# Patient Record
Sex: MALE | Race: WHITE | NOT HISPANIC OR LATINO | Employment: FULL TIME | URBAN - METROPOLITAN AREA
[De-identification: names, ages, dates, MRNs, and addresses within clinical notes are randomized per-mention and may not be internally consistent; named-entity substitution may affect disease eponyms.]

---

## 2018-05-21 ENCOUNTER — TRANSFERRED RECORDS (OUTPATIENT)
Dept: MULTI SPECIALTY CLINIC | Facility: CLINIC | Age: 53
End: 2018-05-21

## 2021-05-03 ENCOUNTER — OFFICE VISIT (OUTPATIENT)
Dept: LAB | Facility: SCHOOL | Age: 56
End: 2021-05-03
Payer: COMMERCIAL

## 2021-05-03 VITALS
HEIGHT: 71 IN | TEMPERATURE: 98.2 F | BODY MASS INDEX: 30.49 KG/M2 | RESPIRATION RATE: 17 BRPM | WEIGHT: 217.8 LBS | OXYGEN SATURATION: 95 % | HEART RATE: 82 BPM | SYSTOLIC BLOOD PRESSURE: 110 MMHG | DIASTOLIC BLOOD PRESSURE: 70 MMHG

## 2021-05-03 DIAGNOSIS — Z00.00 WELLNESS EXAMINATION: Primary | ICD-10-CM

## 2021-05-03 PROBLEM — D12.8 BENIGN NEOPLASM OF RECTUM: Status: ACTIVE | Noted: 2018-05-21

## 2021-05-03 PROBLEM — E78.5 HYPERLIPIDEMIA: Status: ACTIVE | Noted: 2021-05-03

## 2021-05-03 PROBLEM — K57.30 DIVERTICULOSIS OF COLON, ACQUIRED: Status: ACTIVE | Noted: 2019-02-19

## 2021-05-03 PROBLEM — E66.9 MODERATE OBESITY: Status: ACTIVE | Noted: 2018-04-06

## 2021-05-03 PROBLEM — M47.816 LUMBAR ARTHROPATHY: Status: ACTIVE | Noted: 2020-12-23

## 2021-05-03 PROBLEM — M50.20 CERVICAL DISC HERNIATION: Status: ACTIVE | Noted: 2020-08-11

## 2021-05-03 PROCEDURE — 99203 OFFICE O/P NEW LOW 30 MIN: CPT

## 2021-05-03 RX ORDER — DICLOFENAC SODIUM 75 MG/1
75 TABLET, DELAYED RELEASE ORAL DAILY
COMMUNITY
Start: 2020-12-23 | End: 2021-07-21

## 2021-05-03 RX ORDER — PRAVASTATIN SODIUM 40 MG
40 TABLET ORAL DAILY
COMMUNITY
Start: 2021-03-11 | End: 2021-07-21

## 2021-05-03 RX ORDER — TADALAFIL 10 MG/1
20 TABLET ORAL PRN
COMMUNITY
Start: 2021-01-22 | End: 2021-07-21

## 2021-05-03 ASSESSMENT — MIFFLIN-ST. JEOR: SCORE: 1841.09

## 2021-05-03 NOTE — PROGRESS NOTES
"  SUBJECTIVE:  CC: John Guzman is an 56 year old woman who presents for Wellness Check at [facility].     Review of Healthy Lifestyle:    Do you get at least three servings of calcium containing foods daily (dairy, green leafy vegetables, etc.)? yes     Do you have a high-fiber diet? No, but does get some fiber in breakfast, whole grains, greens    Amount of exercise or daily activities, outside of work: 1 hour(s) per day    Do you wear sunscreen on a regular basis? Yes      Are you taking your medications regularly Yes     Have you had an eye exam in the past two years? yes    Do you see a dentist twice per year? yes    Do you have sleep apnea, excessive snoring or excessive daytime drowsiness? no    Do you use tobacco in any form? no       OBJECTIVE:    Vitals: /70   Pulse 82   Temp 98.2  F (36.8  C) (Tympanic)   Resp 17   Ht 1.797 m (5' 10.75\")   Wt 98.8 kg (217 lb 12.8 oz)   SpO2 95%   BMI 30.59 kg/m    BMI= Body mass index is 30.59 kg/m .    Right Ear:  500Hz: RESPONSE- on Level: 40 db   1000 Hz: RESPONSE- on Level: 40 db   2000 Hz: RESPONSE- on Level: 40 db   4000 Hz: RESPONSE- on Level: tone not heard    Left Ear:       500Hz:  RESPONSE- on Level: 40 db   1000 Hz: RESPONSE- on Level: 40 db   2000 Hz: RESPONSE- on Level: 40 db   4000 Hz: RESPONSE- on Level: tone not heard  Patient was seen and has hearing aids that he is picking up.    VISION:    Corrective lenses?  No, has had an exam in the past year     Medication Reconciliation: complete    ASSESSMENT/PLAN:  Patient is here today for wellness examination.  Patient was given information on recommendations for health, preventative care, diet and lifestyle changes for her health.  No referrals needed today.    COUNSELING:      reports that he has never smoked. He has never used smokeless tobacco.    Estimated body mass index is 30.59 kg/m  as calculated from the following:    Height as of this encounter: " "1.797 m (5' 10.75\").    Weight as of this encounter: 98.8 kg (217 lb 12.8 oz).   Weight management plan: Discussed healthy diet and exercise guidelines    Emma Romano NP on 5/3/2021 at 10:44 AM  [facility]     "

## 2021-05-03 NOTE — PATIENT INSTRUCTIONS
"                John Guzman has completed a Wellness Check at the [facility] on 5/3/2021.      ____________________________________________  [place] [employer] PROVID[employer]                                                                               Wellness Visit Recommendations:      See your regular primary care health provider every year in order to help stay healthy.    Review health changes.     Review your medicines if your doctor has prescribed any.    Talk to your provider about how often to have your cholesterol checked.    If you are at risk for diabetes, you should have a diabetes test (fasting glucose).    Shots: Get a flu shot each year. Get a tetanus shot every 10 years.     Review with your primary care provider other immunizations that you may need based on your age and/or medical/surgical history    Nutrition:     Eat at least 5 servings of fruits and vegetables each day.    Eat whole-grain bread, whole-wheat pasta and brown rice instead of white grains and rice.    Preventive Care to be reviewed by your primary care provider:    Females:        Cervical Cancer Screening                          Breast Cancer Screening                          Colon Cancer Screening  Males:             Prostrate Cancer Screening                          Colon Cancer Screening      Lifestyle:    Exercise at least 150 minutes a week (30 minutes a day, 5 days of the week). This will help you control your weight and help prevent disease or manage disease.    Limit alcohol to one drink per day or less depending on your past medical history.    No smoking.     Wear sunscreen to prevent skin cancer.    See your dentist every six months for an exam and cleaning.    Today's Vital Signs:  /70   Pulse 82   Temp 98.2  F (36.8  C) (Tympanic)   Resp 17   Ht 1.797 m (5' 10.75\")   Wt 98.8 kg (217 lb 12.8 oz)   SpO2 95%   BMI 30.59 kg/m    "

## 2021-06-05 ENCOUNTER — HEALTH MAINTENANCE LETTER (OUTPATIENT)
Age: 56
End: 2021-06-05

## 2021-07-20 ENCOUNTER — TELEPHONE (OUTPATIENT)
Dept: FAMILY MEDICINE | Facility: CLINIC | Age: 56
End: 2021-07-20

## 2021-07-20 NOTE — TELEPHONE ENCOUNTER
Left message. He has a video visit on Wednesday and this needs to be in person. Please change to in person same time and date is ok.    Lin Oviedo MA

## 2021-07-21 ENCOUNTER — OFFICE VISIT (OUTPATIENT)
Dept: FAMILY MEDICINE | Facility: CLINIC | Age: 56
End: 2021-07-21
Payer: COMMERCIAL

## 2021-07-21 VITALS
SYSTOLIC BLOOD PRESSURE: 128 MMHG | RESPIRATION RATE: 14 BRPM | DIASTOLIC BLOOD PRESSURE: 82 MMHG | BODY MASS INDEX: 29.54 KG/M2 | TEMPERATURE: 97.6 F | OXYGEN SATURATION: 97 % | HEIGHT: 71 IN | WEIGHT: 211 LBS | HEART RATE: 68 BPM

## 2021-07-21 DIAGNOSIS — R20.2 NUMBNESS AND TINGLING OF BOTH FEET: Primary | ICD-10-CM

## 2021-07-21 DIAGNOSIS — R20.0 NUMBNESS AND TINGLING OF BOTH FEET: Primary | ICD-10-CM

## 2021-07-21 DIAGNOSIS — N52.9 ERECTILE DYSFUNCTION, UNSPECIFIED ERECTILE DYSFUNCTION TYPE: ICD-10-CM

## 2021-07-21 DIAGNOSIS — K22.70 BARRETT'S ESOPHAGUS WITHOUT DYSPLASIA: ICD-10-CM

## 2021-07-21 DIAGNOSIS — E78.5 HYPERLIPIDEMIA, UNSPECIFIED HYPERLIPIDEMIA TYPE: ICD-10-CM

## 2021-07-21 LAB
HBA1C MFR BLD: 5.8 % (ref 0–5.6)
TSH SERPL DL<=0.005 MIU/L-ACNC: 2.5 MU/L (ref 0.4–4)
VIT B12 SERPL-MCNC: 709 PG/ML (ref 193–986)

## 2021-07-21 PROCEDURE — 84443 ASSAY THYROID STIM HORMONE: CPT | Performed by: FAMILY MEDICINE

## 2021-07-21 PROCEDURE — 99204 OFFICE O/P NEW MOD 45 MIN: CPT | Performed by: FAMILY MEDICINE

## 2021-07-21 PROCEDURE — 36415 COLL VENOUS BLD VENIPUNCTURE: CPT | Performed by: FAMILY MEDICINE

## 2021-07-21 PROCEDURE — 83036 HEMOGLOBIN GLYCOSYLATED A1C: CPT | Performed by: FAMILY MEDICINE

## 2021-07-21 PROCEDURE — 82607 VITAMIN B-12: CPT | Performed by: FAMILY MEDICINE

## 2021-07-21 RX ORDER — TADALAFIL 20 MG/1
20 TABLET ORAL
COMMUNITY
Start: 2021-07-08 | End: 2021-07-21

## 2021-07-21 RX ORDER — TADALAFIL 20 MG/1
20 TABLET ORAL DAILY PRN
Qty: 6 TABLET | Refills: 3 | Status: SHIPPED | OUTPATIENT
Start: 2021-07-21 | End: 2022-01-21

## 2021-07-21 RX ORDER — PRAVASTATIN SODIUM 40 MG
40 TABLET ORAL DAILY
Qty: 90 TABLET | Refills: 3 | Status: SHIPPED | OUTPATIENT
Start: 2021-07-21 | End: 2023-03-30

## 2021-07-21 ASSESSMENT — MIFFLIN-ST. JEOR: SCORE: 1810.25

## 2021-07-21 NOTE — LETTER
July 22, 2021      John Guzman  [address] [address] [facility] [address]        Dear ,    We are writing to inform you of your test results.  Most recent vitamin B12 and thyroid testing has returned satisfactory within normal limits. Hemoglobin A1c is elevated at 5.8 which is within the prediabetic range.  No need to start a medication at this time, will continue to work on healthy diet rich in fruits and vegetables and also exercise at least 150 minutes/week.  Plan to recheck hemoglobin A1c in 1 year.     If your symptoms worsen please let me know and we can further evaluate them.    Resulted Orders   Hemoglobin A1c   Result Value Ref Range    Hemoglobin A1C 5.8 (H) 0.0 - 5.6 %      Comment:      Normal <5.7%   Prediabetes 5.7-6.4%    Diabetes 6.5% or higher     Note: Adopted from ADA consensus guidelines.   TSH with free T4 reflex   Result Value Ref Range    TSH 2.50 0.40 - 4.00 mU/L   Vitamin B12   Result Value Ref Range    Vitamin B12 709 193 - 986 pg/mL     If you have any questions or concerns, please call the clinic at the number listed above.     Sincerely,        Neno Sargent, DO

## 2021-07-21 NOTE — PROGRESS NOTES
Assessment & Plan     Numbness and tingling of both feet  No pain, occasional numbness and tingling in the feet.  Is not real bothersome to the patient.  Evaluate with labs as below.  Discussed further evaluation with EMG versus neurology referral patient would like to hold off at this time which I feel is appropriate.   - Hemoglobin A1c  - TSH with free T4 reflex  - Vitamin B12    Traylor's esophagus without dysplasia  Stable. Doing well on Prilosec 20 mg daily. Refill provided today  - omeprazole (PRILOSEC) 20 MG DR capsule  Dispense: 90 capsule; Refill: 3    Erectile dysfunction, unspecified erectile dysfunction type  Doing well. Using Cialis once weekly. No concerns, refill provided today.   - tadalafil (CIALIS) 20 MG tablet  Dispense: 6 tablet; Refill: 3    Hyperlipidemia, unspecified hyperlipidemia type  Recently checked. 3/2021  Recent cholesterol testing showed total cholesterol 70, HDL 34, LDL of 95, triglycerides 205.  Continue Pravastatin.   - pravastatin (PRAVACHOL) 40 MG tablet  Dispense: 90 tablet; Refill: 3      The risks, benefits and treatment options of prescribed medications or other treatments have been discussed with the patient. The patient verbalized their understanding and should call or follow up if no improvement or if they develop further problems.      Neno Sargent DO  [facility] ALEKSEY Franklin is a 56 year old who presents for the following health issues     HPI   56-year-old male with past medical history of Traylor's esophagus, diverticulosis, hyperlipidemia, and erectile dysfunction presents to clinic to establish care.    Hyperlipidemia Follow-Up      Are you regularly taking any medication or supplement to lower your cholesterol?   Yes- prevastatin    Are you having muscle aches or other side effects that you think could be caused by your cholesterol lowering medication?  Yes- is experiencing some foot tingling ongoing for approx 6+ mo and  "bilateral hand itching for the same amount of time - he states this started around the same time he started taking the pravastatin    Currently on Pravastatin 40 mg he. Tolerating it well at this time.   Recent cholesterol testing showed total cholesterol 70, HDL 34, LDL of 95, triglycerides 205.    Feet tingling  Onset 6 months ago.  Feet tingling at times. No pain just occasionally happens. Also with some tingling in palms though less pronounced. 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  No weakness.     No recent diabetic screening.   No recent thyroid screen. No recent Vitamin B12 screen.   Feet tingling isn't real bothersome or affecting him but wanted to bring it up.         How many servings of fruits and vegetables do you eat daily?  2-3    On average, how many sweetened beverages do you drink each day (Examples: soda, juice, sweet tea, etc.  Do NOT count diet or artificially sweetened beverages)?   2 8 oz cans     How many days per week do you exercise enough to make your heart beat faster? 4    How many minutes a day do you exercise enough to make your heart beat faster? 30 - 60    How many days per week do you miss taking your medication? 0    Erectile dysfunction.   Cialis using once a week. 10 mg.   , things going well.   Needs refill.     Traylor's esophagus  Currently taking Prilosec 20 mg daily.  No GERD symptoms.  No abdominal pain.  History of smoking quit > 25 years ago.   Review of Systems   Constitutional, HEENT, cardiovascular, pulmonary, gi and gu systems are negative, except as otherwise noted.      Objective    /82 (BP Location: Right arm, Patient Position: Sitting, Cuff Size: Adult Large)   Pulse 68   Temp 97.6  F (36.4  C) (Tympanic)   Resp 14   Ht 1.797 m (5' 10.75\")   Wt 95.7 kg (211 lb)   SpO2 97%   BMI 29.64 kg/m    Body mass index is 29.64 kg/m .  Physical Exam   General: alert, cooperative, no acute distress   CV: RRR, no murmur  Resp: " non-labored breathing, clear to auscultation, no wheezing or rales   Abdomen: Soft, non-tender, no guarding.   Extremities: No peripheral edema, calves non-tender. Monofilament testing 10/10 bilaterally on feet. No open sores or lesions. No foot erythema.

## 2021-07-22 PROBLEM — R73.03 PREDIABETES: Status: ACTIVE | Noted: 2021-07-22

## 2021-07-22 NOTE — RESULT ENCOUNTER NOTE
Most recent vitamin B12 and thyroid testing has returned satisfactory within normal limits.    Hemoglobin A1c is elevated at 5.8 which is within the prediabetic range.  No need to start a medication at this time, will continue to work on healthy diet rich in fruits and vegetables and also exercise at least 150 minutes/week.  Plan to recheck hemoglobin A1c in 1 year.    If your symptoms worsen please let me know and we can further evaluate them.    Dr. Neno Sargent

## 2021-09-25 ENCOUNTER — HEALTH MAINTENANCE LETTER (OUTPATIENT)
Age: 56
End: 2021-09-25

## 2021-10-23 ENCOUNTER — HOSPITAL ENCOUNTER (EMERGENCY)
Facility: CLINIC | Age: 56
Discharge: HOME | End: 2021-10-23
Attending: PHYSICIAN ASSISTANT | Admitting: PHYSICIAN ASSISTANT
Payer: COMMERCIAL

## 2021-10-23 VITALS
SYSTOLIC BLOOD PRESSURE: 141 MMHG | WEIGHT: 215 LBS | HEART RATE: 79 BPM | OXYGEN SATURATION: 93 % | RESPIRATION RATE: 16 BRPM | TEMPERATURE: 97.9 F | BODY MASS INDEX: 30.2 KG/M2 | DIASTOLIC BLOOD PRESSURE: 105 MMHG

## 2021-10-23 DIAGNOSIS — T24.001A: ICD-10-CM

## 2021-10-23 DIAGNOSIS — T22.211A BURN OF RIGHT FOREARM, SECOND DEGREE, INITIAL ENCOUNTER: ICD-10-CM

## 2021-10-23 LAB
ANION GAP SERPL CALCULATED.3IONS-SCNC: 7 MMOL/L (ref 3–14)
BASOPHILS # BLD AUTO: 0 10E3/UL (ref 0–0.2)
BASOPHILS NFR BLD AUTO: 1 %
BUN SERPL-MCNC: 11 MG/DL (ref 7–30)
CALCIUM SERPL-MCNC: 8.3 MG/DL (ref 8.5–10.1)
CHLORIDE BLD-SCNC: 110 MMOL/L (ref 94–109)
CO2 SERPL-SCNC: 24 MMOL/L (ref 20–32)
CREAT SERPL-MCNC: 0.97 MG/DL (ref 0.66–1.25)
EOSINOPHIL # BLD AUTO: 0.2 10E3/UL (ref 0–0.7)
EOSINOPHIL NFR BLD AUTO: 3 %
ERYTHROCYTE [DISTWIDTH] IN BLOOD BY AUTOMATED COUNT: 12.2 % (ref 10–15)
GFR SERPL CREATININE-BSD FRML MDRD: 88 ML/MIN/1.73M2
GLUCOSE BLD-MCNC: 114 MG/DL (ref 70–99)
HCT VFR BLD AUTO: 41.6 % (ref 40–53)
HGB BLD-MCNC: 14.1 G/DL (ref 13.3–17.7)
IMM GRANULOCYTES # BLD: 0 10E3/UL
IMM GRANULOCYTES NFR BLD: 0 %
LYMPHOCYTES # BLD AUTO: 1.7 10E3/UL (ref 0.8–5.3)
LYMPHOCYTES NFR BLD AUTO: 28 %
MCH RBC QN AUTO: 29.1 PG (ref 26.5–33)
MCHC RBC AUTO-ENTMCNC: 33.9 G/DL (ref 31.5–36.5)
MCV RBC AUTO: 86 FL (ref 78–100)
MONOCYTES # BLD AUTO: 0.6 10E3/UL (ref 0–1.3)
MONOCYTES NFR BLD AUTO: 10 %
NEUTROPHILS # BLD AUTO: 3.5 10E3/UL (ref 1.6–8.3)
NEUTROPHILS NFR BLD AUTO: 58 %
NRBC # BLD AUTO: 0 10E3/UL
NRBC BLD AUTO-RTO: 0 /100
PLATELET # BLD AUTO: 182 10E3/UL (ref 150–450)
POTASSIUM BLD-SCNC: 3.8 MMOL/L (ref 3.4–5.3)
RBC # BLD AUTO: 4.85 10E6/UL (ref 4.4–5.9)
SODIUM SERPL-SCNC: 141 MMOL/L (ref 133–144)
WBC # BLD AUTO: 5.9 10E3/UL (ref 4–11)

## 2021-10-23 PROCEDURE — 96361 HYDRATE IV INFUSION ADD-ON: CPT

## 2021-10-23 PROCEDURE — 250N000013 HC RX MED GY IP 250 OP 250 PS 637: Performed by: PHYSICIAN ASSISTANT

## 2021-10-23 PROCEDURE — 80048 BASIC METABOLIC PNL TOTAL CA: CPT | Performed by: PHYSICIAN ASSISTANT

## 2021-10-23 PROCEDURE — 258N000003 HC RX IP 258 OP 636: Performed by: PHYSICIAN ASSISTANT

## 2021-10-23 PROCEDURE — 96374 THER/PROPH/DIAG INJ IV PUSH: CPT

## 2021-10-23 PROCEDURE — 99285 EMERGENCY DEPT VISIT HI MDM: CPT | Mod: 25

## 2021-10-23 PROCEDURE — 99285 EMERGENCY DEPT VISIT HI MDM: CPT | Performed by: FAMILY MEDICINE

## 2021-10-23 PROCEDURE — 96376 TX/PRO/DX INJ SAME DRUG ADON: CPT

## 2021-10-23 PROCEDURE — 250N000011 HC RX IP 250 OP 636: Performed by: PHYSICIAN ASSISTANT

## 2021-10-23 PROCEDURE — 90715 TDAP VACCINE 7 YRS/> IM: CPT | Performed by: PHYSICIAN ASSISTANT

## 2021-10-23 PROCEDURE — 90471 IMMUNIZATION ADMIN: CPT | Performed by: PHYSICIAN ASSISTANT

## 2021-10-23 PROCEDURE — 36415 COLL VENOUS BLD VENIPUNCTURE: CPT | Performed by: PHYSICIAN ASSISTANT

## 2021-10-23 PROCEDURE — 85025 COMPLETE CBC W/AUTO DIFF WBC: CPT | Performed by: PHYSICIAN ASSISTANT

## 2021-10-23 RX ORDER — HYDROMORPHONE HYDROCHLORIDE 1 MG/ML
0.5 INJECTION, SOLUTION INTRAMUSCULAR; INTRAVENOUS; SUBCUTANEOUS
Status: COMPLETED | OUTPATIENT
Start: 2021-10-23 | End: 2021-10-23

## 2021-10-23 RX ORDER — OXYCODONE AND ACETAMINOPHEN 5; 325 MG/1; MG/1
1 TABLET ORAL EVERY 6 HOURS PRN
Qty: 15 TABLET | Refills: 0 | Status: SHIPPED | OUTPATIENT
Start: 2021-10-23 | End: 2021-10-27

## 2021-10-23 RX ORDER — HYDROCODONE BITARTRATE AND ACETAMINOPHEN 5; 325 MG/1; MG/1
1 TABLET ORAL ONCE
Status: COMPLETED | OUTPATIENT
Start: 2021-10-23 | End: 2021-10-23

## 2021-10-23 RX ORDER — SODIUM CHLORIDE, SODIUM LACTATE, POTASSIUM CHLORIDE, CALCIUM CHLORIDE 600; 310; 30; 20 MG/100ML; MG/100ML; MG/100ML; MG/100ML
INJECTION, SOLUTION INTRAVENOUS CONTINUOUS
Status: DISCONTINUED | OUTPATIENT
Start: 2021-10-23 | End: 2021-10-23 | Stop reason: HOSPADM

## 2021-10-23 RX ADMIN — HYDROCODONE BITARTRATE AND ACETAMINOPHEN 1 TABLET: 5; 325 TABLET ORAL at 17:26

## 2021-10-23 RX ADMIN — CLOSTRIDIUM TETANI TOXOID ANTIGEN (FORMALDEHYDE INACTIVATED), CORYNEBACTERIUM DIPHTHERIAE TOXOID ANTIGEN (FORMALDEHYDE INACTIVATED), BORDETELLA PERTUSSIS TOXOID ANTIGEN (GLUTARALDEHYDE INACTIVATED), BORDETELLA PERTUSSIS FILAMENTOUS HEMAGGLUTININ ANTIGEN (FORMALDEHYDE INACTIVATED), BORDETELLA PERTUSSIS PERTACTIN ANTIGEN, AND BORDETELLA PERTUSSIS FIMBRIAE 2/3 ANTIGEN 0.5 ML: 5; 2; 2.5; 5; 3; 5 INJECTION, SUSPENSION INTRAMUSCULAR at 17:31

## 2021-10-23 RX ADMIN — HYDROMORPHONE HYDROCHLORIDE 0.5 MG: 1 INJECTION, SOLUTION INTRAMUSCULAR; INTRAVENOUS; SUBCUTANEOUS at 18:37

## 2021-10-23 RX ADMIN — SODIUM CHLORIDE, POTASSIUM CHLORIDE, SODIUM LACTATE AND CALCIUM CHLORIDE: 600; 310; 30; 20 INJECTION, SOLUTION INTRAVENOUS at 17:27

## 2021-10-23 RX ADMIN — HYDROMORPHONE HYDROCHLORIDE 0.5 MG: 1 INJECTION, SOLUTION INTRAMUSCULAR; INTRAVENOUS; SUBCUTANEOUS at 19:36

## 2021-10-23 ASSESSMENT — ENCOUNTER SYMPTOMS
GASTROINTESTINAL NEGATIVE: 1
LIGHT-HEADEDNESS: 1
WOUND: 1
CONSTITUTIONAL NEGATIVE: 1
CARDIOVASCULAR NEGATIVE: 1
RESPIRATORY NEGATIVE: 1
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
## 2021-12-30 ENCOUNTER — LAB (OUTPATIENT)
Dept: URGENT CARE | Facility: URGENT CARE | Age: 56
End: 2021-12-30
Attending: EMERGENCY MEDICINE
Payer: COMMERCIAL

## 2021-12-30 ENCOUNTER — E-VISIT (OUTPATIENT)
Dept: URGENT CARE | Facility: CLINIC | Age: 56
End: 2021-12-30
Payer: COMMERCIAL

## 2021-12-30 DIAGNOSIS — Z20.822 SUSPECTED COVID-19 VIRUS INFECTION: Primary | ICD-10-CM

## 2021-12-30 DIAGNOSIS — Z20.822 SUSPECTED COVID-19 VIRUS INFECTION: ICD-10-CM

## 2021-12-30 PROCEDURE — U0003 INFECTIOUS AGENT DETECTION BY NUCLEIC ACID (DNA OR RNA); SEVERE ACUTE RESPIRATORY SYNDROME CORONAVIRUS 2 (SARS-COV-2) (CORONAVIRUS DISEASE [COVID-19]), AMPLIFIED PROBE TECHNIQUE, MAKING USE OF HIGH THROUGHPUT TECHNOLOGIES AS DESCRIBED BY CMS-2020-01-R: HCPCS

## 2021-12-30 PROCEDURE — U0005 INFEC AGEN DETEC AMPLI PROBE: HCPCS

## 2021-12-30 PROCEDURE — 99421 OL DIG E/M SVC 5-10 MIN: CPT | Performed by: EMERGENCY MEDICINE

## 2021-12-30 NOTE — PATIENT INSTRUCTIONS
Rigoberto,      Based on your responses, you may have coronavirus (COVID-19). This illness can cause fever, cough and trouble breathing. Many people get a mild case and get better on their own. Some people can get very sick.    Will I be tested for COVID-19?  We would like to test you for COVID-19 virus. I have placed orders for this test.     To schedule: go to your [facility] home page and scroll down to the section that says  You have an appointment that needs to be scheduled  and click the large green button that says  Schedule Now  and follow the steps to find the next available openings.    If you are unable to complete these [facility] scheduling steps, please call [phone] to schedule your testing.     Return to work/school/ guidance:  Please let your workplace manager and staffing office know when your isolation ends.       If you receive a positive COVID-19 test result, follow the guidance of the those who are giving you the results. Usually the return to work is 10 days from symptom onset or positive test date, (or in some cases 20 days if you are immunocompromised). If your symptoms started after your positive test, the 10 days should start when your symptoms started.   o If you work at [email or web address], you must also be cleared by Employee Occupational Health and Safety to return to work.      If you receive a negative COVID-19 test result and did not have a high risk exposure to someone with a known positive COVID-19 test, you can return to work once you're free of fever for 24 hours without fever-reducing medication and your symptoms are improving or resolved.    If you receive a negative COVID-19 test and had a high-risk exposure to someone who has tested positive for COVID-19 then you can return to work 14 days after your last contact with the positive individual. Follow quarantine guidance given by your doctor or public health officials.     Sign up for [employer]:  We know it's scary to hear  that you might have COVID-19. We want to track your symptoms to make sure you're okay over the next 2 weeks. Please look for an email from [employer]--this is a free, online program that we'll use to keep in touch. To sign up, follow the link in the email you will receive. Learn more at [email or web address]    How can I take care of myself?  Over the counter medications may help with your symptoms like congestion, cough, chills, or fever. There are not many effective prescription treatments for early COVID-19. Hydroxychloroquine, ivermectin, and azithromycin are not effective or recommended for COVID-19.    If your symptoms started in the last 10 days, you may be able to receive a treatment with monoclonal antibodies. This treatment can lower your risk of severe illness and going to the hospital. It is given through an IV or under your skin (subcutaneous) and must be given at an infusion center. You must be 12 or older, weight at least 88 pounds, and have a positive COVID-19 test.     If you would like to sign up to be considered to receive the monoclonal antibody medicine, please complete a participation form through the [facility] here: MNRAP ([email or web address]). You may also call the [facility] COVID-19 Public Hotline at 1-220.389.2578 (open Mon-Fri: 9am-7pm and Sat: 10am-6pm).     Not all people who are eligible will receive the medicine, since supply is limited. You will be contacted in the next 1 to 2 business days only if you are selected. If you do not receive a call, you have not been selected to receive the medicine. If you have any questions about this medication, please contact your primary care provider. For more information, see [email or web address]      Get lots of rest. Drink extra fluids (unless a doctor has told you not to)    Take Tylenol (acetaminophen) or ibuprofen for fever or pain.  If you have liver or kidney problems, ask your family doctor if it's okay to take Tylenol o ibuprofen    Take over the counter medications for your symptoms, as directed by your doctor. You may also talk to your pharmacist.      If you have other health problems (like cancer, heart failure, an organ transplant or severe kidney disease): Call your specialty clinic if you don't feel better in the next 2 days.    Know when to call 911. Emergency warning signs include:  o Trouble breathing or shortness of breath  o Pain or pressure in the chest that doesn't go away  o Feeling confused like you haven't felt before, or not being able to wake up  o Bluish-colored lips or face    Where can I get more information?     [facility] [place] - About COVID-19: [email or web address]     CDC - What to Do If You're Sick:     www.cdc.gov/coronavirus/2019-ncov/about/steps-when-sick.html    CDC - Ending Home Isolation:  https://www.cdc.gov/coronavirus/2019-ncov/your-health/quarantine-isolation.html    CDC - Caring for Someone:  www.cdc.gov/coronavirus/2019-ncov/if-you-are-sick/care-for-someone.html    [facility] clinical trials (COVID-19 research studies): clinicalaffairs.[facility]-clinical-trials    Below are the COVID-19 hotlines at the [facility] ([facility]). Interpreters are available.  o For health questions: Call 782-732-2817 or 1-239.684.1393 (7 a.m. to 7 p.m.)  o For questions about schools and childcare: Call 775-926-4919 or 1-689.769.8694 (7 a.m. to 7 p.m.)

## 2021-12-31 LAB — SARS-COV-2 RNA RESP QL NAA+PROBE: POSITIVE

## 2022-01-18 DIAGNOSIS — N52.9 ERECTILE DYSFUNCTION, UNSPECIFIED ERECTILE DYSFUNCTION TYPE: ICD-10-CM

## 2022-01-21 RX ORDER — TADALAFIL 20 MG/1
20 TABLET ORAL DAILY PRN
Qty: 6 TABLET | Refills: 3 | Status: SHIPPED | OUTPATIENT
Start: 2022-01-21 | End: 2023-11-22

## 2022-03-08 ENCOUNTER — ANCILLARY PROCEDURE (OUTPATIENT)
Dept: GENERAL RADIOLOGY | Facility: OTHER | Age: 57
End: 2022-03-08
Attending: STUDENT IN AN ORGANIZED HEALTH CARE EDUCATION/TRAINING PROGRAM
Payer: COMMERCIAL

## 2022-03-08 ENCOUNTER — OFFICE VISIT (OUTPATIENT)
Dept: FAMILY MEDICINE | Facility: OTHER | Age: 57
End: 2022-03-08
Payer: COMMERCIAL

## 2022-03-08 VITALS
SYSTOLIC BLOOD PRESSURE: 126 MMHG | OXYGEN SATURATION: 96 % | TEMPERATURE: 97.7 F | RESPIRATION RATE: 17 BRPM | WEIGHT: 227 LBS | DIASTOLIC BLOOD PRESSURE: 72 MMHG | HEART RATE: 88 BPM | BODY MASS INDEX: 31.88 KG/M2

## 2022-03-08 DIAGNOSIS — G89.29 CHRONIC RIGHT SHOULDER PAIN: ICD-10-CM

## 2022-03-08 DIAGNOSIS — M54.2 NECK PAIN: Primary | ICD-10-CM

## 2022-03-08 DIAGNOSIS — M25.511 CHRONIC RIGHT SHOULDER PAIN: ICD-10-CM

## 2022-03-08 DIAGNOSIS — Z11.4 SCREENING FOR HIV (HUMAN IMMUNODEFICIENCY VIRUS): ICD-10-CM

## 2022-03-08 DIAGNOSIS — Z11.4 ENCOUNTER FOR SCREENING FOR HIV: ICD-10-CM

## 2022-03-08 DIAGNOSIS — Z13.1 DIABETES MELLITUS SCREENING: ICD-10-CM

## 2022-03-08 DIAGNOSIS — Z11.59 NEED FOR HEPATITIS C SCREENING TEST: ICD-10-CM

## 2022-03-08 DIAGNOSIS — Z13.220 SCREENING FOR HYPERLIPIDEMIA: ICD-10-CM

## 2022-03-08 DIAGNOSIS — E66.9 OBESITY (BMI 30-39.9): ICD-10-CM

## 2022-03-08 DIAGNOSIS — Z13.220 ENCOUNTER FOR SCREENING FOR LIPID DISORDER: ICD-10-CM

## 2022-03-08 DIAGNOSIS — Z98.890 STATUS POST CERVICAL DISCECTOMY: ICD-10-CM

## 2022-03-08 LAB
CHOLEST SERPL-MCNC: 246 MG/DL
FASTING STATUS PATIENT QL REPORTED: NO
HBA1C MFR BLD: 5.9 % (ref 0–5.6)
HDLC SERPL-MCNC: 34 MG/DL
LDLC SERPL CALC-MCNC: 143 MG/DL
LDLC SERPL CALC-MCNC: ABNORMAL MG/DL
NONHDLC SERPL-MCNC: 212 MG/DL
TRIGL SERPL-MCNC: 510 MG/DL

## 2022-03-08 PROCEDURE — 36415 COLL VENOUS BLD VENIPUNCTURE: CPT | Performed by: STUDENT IN AN ORGANIZED HEALTH CARE EDUCATION/TRAINING PROGRAM

## 2022-03-08 PROCEDURE — 99214 OFFICE O/P EST MOD 30 MIN: CPT | Performed by: STUDENT IN AN ORGANIZED HEALTH CARE EDUCATION/TRAINING PROGRAM

## 2022-03-08 PROCEDURE — 72040 X-RAY EXAM NECK SPINE 2-3 VW: CPT | Performed by: RADIOLOGY

## 2022-03-08 PROCEDURE — 83721 ASSAY OF BLOOD LIPOPROTEIN: CPT | Mod: 59 | Performed by: STUDENT IN AN ORGANIZED HEALTH CARE EDUCATION/TRAINING PROGRAM

## 2022-03-08 PROCEDURE — 83036 HEMOGLOBIN GLYCOSYLATED A1C: CPT | Performed by: STUDENT IN AN ORGANIZED HEALTH CARE EDUCATION/TRAINING PROGRAM

## 2022-03-08 PROCEDURE — 87389 HIV-1 AG W/HIV-1&-2 AB AG IA: CPT | Performed by: STUDENT IN AN ORGANIZED HEALTH CARE EDUCATION/TRAINING PROGRAM

## 2022-03-08 PROCEDURE — 73030 X-RAY EXAM OF SHOULDER: CPT | Mod: RT | Performed by: RADIOLOGY

## 2022-03-08 PROCEDURE — 80061 LIPID PANEL: CPT | Performed by: STUDENT IN AN ORGANIZED HEALTH CARE EDUCATION/TRAINING PROGRAM

## 2022-03-08 PROCEDURE — 86803 HEPATITIS C AB TEST: CPT | Performed by: STUDENT IN AN ORGANIZED HEALTH CARE EDUCATION/TRAINING PROGRAM

## 2022-03-08 ASSESSMENT — PAIN SCALES - GENERAL: PAINLEVEL: WORST PAIN (10)

## 2022-03-08 NOTE — PATIENT INSTRUCTIONS
Neck: Exercises  Introduction  Here are some examples of exercises for you to try. The exercises may be suggested for a condition or for rehabilitation. Start each exercise slowly. Ease off the exercises if you start to have pain.  You will be told when to start these exercises and which ones will work best for you.  How to do the exercises  Neck stretch   1. This stretch works best if you keep your shoulder down as you lean away from it. To help you remember to do this, start by relaxing your shoulders and lightly holding on to your thighs or your chair.  2. Tilt your head toward your shoulder and hold for 15 to 30 seconds. Let the weight of your head stretch your muscles.  3. If you would like a little added stretch, use your hand to gently and steadily pull your head toward your shoulder. For example, keeping your right shoulder down, lean your head to the left.  4. Repeat 2 to 4 times toward each shoulder.   Diagonal neck stretch   1. Turn your head slightly toward the direction you will be stretching, and tilt your head diagonally toward your chest and hold for 15 to 30 seconds.  2. If you would like a little added stretch, use your hand to gently and steadily pull your head forward on the diagonal.  3. Repeat 2 to 4 times toward each side.   Dorsal glide stretch   The dorsal glide stretches the back of the neck. If you feel pain, do not glide so far back. Some people find this exercise easier to do while lying on their backs with an ice pack on the neck.  1. Sit or stand tall and look straight ahead.  2. Slowly tuck your chin as you glide your head backward over your body  3. Hold for a count of 6, and then relax for up to 10 seconds.  4. Repeat 8 to 12 times.   Chest and shoulder stretch   1. Sit or stand tall and glide your head backward as in the dorsal glide stretch.  2. Raise both arms so that your hands are next to your ears.  3. Take a deep breath, and as you breathe out, lower your elbows down and  behind your back. You will feel your shoulder blades slide down and together, and at the same time you will feel a stretch across your chest and the front of your shoulders.  4. Hold for about 6 seconds, and then relax for up to 10 seconds.  5. Repeat 8 to 12 times.   Strengthening: Hands on head   1. Move your head backward, forward, and side to side against gentle pressure from your hands, holding each position for about 6 seconds.  2. Repeat 8 to 12 times.

## 2022-03-08 NOTE — PROGRESS NOTES
Assessment & Plan     Neck pain  Status post cervical discectomy  Previous C5/C6 arthroplasty on the left side back in 2020 through [facility], patient does have some chronic paresthesias over the C8/T1 distribution distally.  His symptoms are starting on the right side and similar progression prior to his previous left-sided surgery.  He is interested in getting a revisit/establish care with his neurosurgery providers.  I do not appreciate any radiculopathy on the right side today, negative Spurling's.  Possibly could be frozen shoulder even?  Will be getting x-rays of his neck as well as shoulder today.  Also given a handout in regards to home exercises/physical therapy.  We will also send over a neurosurgery consult just for his known previous surgical history.  - Neurosurgery Referral; Future  - XR Cervical Spine 2/3 Views    Chronic right shoulder pain  X-ray today and home exercises/physical therapy.  Could possibly be frozen shoulder.  - XR Shoulder Right G/E 3 Views    Screening for HIV (human immunodeficiency virus)  - HIV Antigen Antibody Combo    Need for hepatitis C screening test  - Hepatitis C Screen Reflex to HCV RNA Quant and Genotype    Screening for hyperlipidemia  Continue pravastatin  - Lipid panel reflex to direct LDL Fasting    Diabetes mellitus screening  Obesity (BMI 30-39.9)  - Hemoglobin A1c    I have spent 30 or more minutes face-to-face with the patient and coordinating care such as reviewing documentation, results, or having discussions over the phone.      DEVIKA LUNA MD  [facility] EILEEN Franklin is a 57 year old who presents for the following health issues     Musculoskeletal Problem    History of Present Illness       Reason for visit:  Right arm & shoulder pain,  Symptom onset:  More than a month  Symptoms include:  Sharp pain like im being stabbed by a pencil  Symptom intensity:  Severe  Symptom progression:  Worsening  Had these  symptoms before:  Yes  Has tried/received treatment for these symptoms:  Yes  Previous treatment was successful:  Yes  Prior treatment description:  Cervical disk replaced  What makes it worse:  Laying on it  What makes it better:  Nothing    He eats 2-3 servings of fruits and vegetables daily.He consumes 2 sweetened beverage(s) daily.He exercises with enough effort to increase his heart rate 20 to 29 minutes per day.  He exercises with enough effort to increase his heart rate 3 or less days per week.   He is taking medications regularly.           Review of Systems   Constitutional, HEENT, cardiovascular, pulmonary, gi and gu systems are negative, except as otherwise noted.      Objective    /72   Pulse 88   Temp 97.7  F (36.5  C) (Temporal)   Resp 17   Wt 103 kg (227 lb)   SpO2 96%   BMI 31.88 kg/m    Body mass index is 31.88 kg/m .  Physical Exam  Vitals and nursing note reviewed.   Constitutional:       General: He is not in acute distress.     Appearance: Normal appearance. He is not ill-appearing, toxic-appearing or diaphoretic.   HENT:      Head: Normocephalic and atraumatic.      Right Ear: Tympanic membrane, ear canal and external ear normal. There is no impacted cerumen.      Left Ear: Tympanic membrane, ear canal and external ear normal. There is no impacted cerumen.      Nose: Nose normal. No congestion or rhinorrhea.      Mouth/Throat:      Mouth: Mucous membranes are moist.      Pharynx: Oropharynx is clear. No oropharyngeal exudate or posterior oropharyngeal erythema.   Eyes:      General:         Right eye: No discharge.         Left eye: No discharge.      Extraocular Movements: Extraocular movements intact.      Conjunctiva/sclera: Conjunctivae normal.      Pupils: Pupils are equal, round, and reactive to light.   Cardiovascular:      Rate and Rhythm: Normal rate and regular rhythm.      Heart sounds: No murmur heard.  Pulmonary:      Effort: Pulmonary effort is normal. No respiratory  distress.      Breath sounds: Normal breath sounds.   Musculoskeletal:         General: Normal range of motion.      Cervical back: Normal range of motion.      Comments: Pain with active and passive range of motion of the right shoulder, tender to palpation over the proximal bicep tendon area/insertion of the pectoralis major near the head of the humerus.  Neurovascular intact distally on the right upper extremity.  Slight decrease in sensation over the left fourth and fifth digit.  Negative Spurling's bilaterally   Lymphadenopathy:      Cervical: No cervical adenopathy.   Neurological:      Mental Status: He is alert.   Psychiatric:         Mood and Affect: Mood normal.         Behavior: Behavior normal.         Thought Content: Thought content normal.

## 2022-03-09 LAB
HCV AB SERPL QL IA: NONREACTIVE
HIV 1+2 AB+HIV1 P24 AG SERPL QL IA: NONREACTIVE

## 2022-03-10 ENCOUNTER — TELEPHONE (OUTPATIENT)
Dept: NEUROSURGERY | Facility: CLINIC | Age: 57
End: 2022-03-10
Payer: COMMERCIAL

## 2022-03-10 ENCOUNTER — TELEPHONE (OUTPATIENT)
Dept: FAMILY MEDICINE | Facility: OTHER | Age: 57
End: 2022-03-10
Payer: COMMERCIAL

## 2022-03-10 DIAGNOSIS — Z98.890 STATUS POST CERVICAL DISCECTOMY: ICD-10-CM

## 2022-03-10 DIAGNOSIS — M25.511 CHRONIC RIGHT SHOULDER PAIN: Primary | ICD-10-CM

## 2022-03-10 DIAGNOSIS — M54.12 CERVICAL RADICULOPATHY: Primary | ICD-10-CM

## 2022-03-10 DIAGNOSIS — G89.29 CHRONIC RIGHT SHOULDER PAIN: Primary | ICD-10-CM

## 2022-03-10 DIAGNOSIS — M54.2 NECK PAIN: ICD-10-CM

## 2022-03-10 RX ORDER — CYCLOBENZAPRINE HCL 10 MG
10 TABLET ORAL 3 TIMES DAILY PRN
Qty: 30 TABLET | Refills: 0 | Status: SHIPPED | OUTPATIENT
Start: 2022-03-10 | End: 2022-03-20

## 2022-03-10 RX ORDER — METHYLPREDNISOLONE 4 MG
TABLET, DOSE PACK ORAL
Qty: 21 TABLET | Refills: 0 | Status: SHIPPED | OUTPATIENT
Start: 2022-03-10 | End: 2022-03-24

## 2022-03-10 RX ORDER — HYDROCODONE BITARTRATE AND ACETAMINOPHEN 5; 325 MG/1; MG/1
1 TABLET ORAL EVERY 6 HOURS PRN
Qty: 12 TABLET | Refills: 0 | Status: SHIPPED | OUTPATIENT
Start: 2022-03-10 | End: 2022-03-13

## 2022-03-10 NOTE — TELEPHONE ENCOUNTER
Pt called in and is in severe pain. Has a pinched nerve. Was just  Seen 2 days ago by pcp. The pain has gotten worse and he hasnt slept in 2 nights. hes afraid he is losing his mind without sleep.

## 2022-03-10 NOTE — TELEPHONE ENCOUNTER
Patient would like an oral sedative to take prior to the MRI due to high pain levels when lying down. Please send to [facility] [employer] in [place]. #[phone].

## 2022-03-10 NOTE — TELEPHONE ENCOUNTER
Phoned in Valium 5 mg #2 to take 1 tab PO 1 hour prior to MRI, may repeat another 1 tab 30 minutes prior to test prn.    Brie Mcallister RN

## 2022-03-10 NOTE — TELEPHONE ENCOUNTER
LM for patient to return call and speak with triage nurse.     Please advise of providers note below. Be sure to indicate medication should be taken sparingly and NO refills will be provided without a follow up office visit.     Anila MUÑOZN, RN

## 2022-03-10 NOTE — TELEPHONE ENCOUNTER
I would appreciate a little bit better triage on this.    Based on review of the medical record I would be willing to send 3 prescriptions to the pharmacy for him which are very short duration and he will need to follow-up with his primary care provider for further refills.  There will be no refills without an office visit.  He is to use the narcotics sparingly and please make him aware that it might not help his pain at all but hopefully the muscle relaxant and the steroid will help.

## 2022-03-11 ENCOUNTER — MEDICAL CORRESPONDENCE (OUTPATIENT)
Dept: NEUROSURGERY | Facility: CLINIC | Age: 57
End: 2022-03-11
Payer: COMMERCIAL

## 2022-03-11 NOTE — TELEPHONE ENCOUNTER
Called patient and advised of information below. Patient has no further questions at this time.     Anila Farias BSN, RN

## 2022-03-15 ENCOUNTER — TELEPHONE (OUTPATIENT)
Dept: NEUROSURGERY | Facility: CLINIC | Age: 57
End: 2022-03-15
Payer: COMMERCIAL

## 2022-03-15 NOTE — CONFIDENTIAL NOTE
Discussed rigoberto the history of his arm symptoms.. Rigoberto reports arm symptoms for almost one year. He has tried home physical therapy/stretching/yoga and conservative measures including ice/heat/OTC medications. He was able to describe to me in detail these exercises, he received a print out from physical therapy after his previous surgery.   Exercises include rhomboid, thoracic extension, nerve glide, using a stretch band while extending both arms outward, Stretching the arms across the chest etc. He tells me he has done these multiple times a week over the last year without improvement.        The arm pain has been refractory to these conservative measures. MRI was ordered to better evaluate.

## 2022-03-16 ENCOUNTER — HOSPITAL ENCOUNTER (OUTPATIENT)
Dept: MRI IMAGING | Facility: CLINIC | Age: 57
Discharge: HOME | End: 2022-03-16
Attending: SURGERY
Payer: COMMERCIAL

## 2022-03-16 ENCOUNTER — HOSPITAL ENCOUNTER (OUTPATIENT)
Dept: GENERAL RADIOLOGY | Facility: CLINIC | Age: 57
Discharge: HOME | End: 2022-03-16
Attending: SURGERY
Payer: COMMERCIAL

## 2022-03-16 DIAGNOSIS — M54.12 CERVICAL RADICULOPATHY: ICD-10-CM

## 2022-03-16 PROCEDURE — 255N000002 HC RX 255 OP 636: Performed by: SURGERY

## 2022-03-16 PROCEDURE — A9585 GADOBUTROL INJECTION: HCPCS | Performed by: SURGERY

## 2022-03-16 PROCEDURE — 72156 MRI NECK SPINE W/O & W/DYE: CPT

## 2022-03-16 PROCEDURE — 72040 X-RAY EXAM NECK SPINE 2-3 VW: CPT

## 2022-03-16 RX ORDER — GADOBUTROL 604.72 MG/ML
10 INJECTION INTRAVENOUS ONCE
Status: COMPLETED | OUTPATIENT
Start: 2022-03-16 | End: 2022-03-16

## 2022-03-16 RX ADMIN — GADOBUTROL 10 ML: 604.72 INJECTION INTRAVENOUS at 09:56

## 2022-03-24 ENCOUNTER — OFFICE VISIT (OUTPATIENT)
Dept: NEUROSURGERY | Facility: CLINIC | Age: 57
End: 2022-03-24
Payer: COMMERCIAL

## 2022-03-24 VITALS
DIASTOLIC BLOOD PRESSURE: 85 MMHG | HEIGHT: 71 IN | WEIGHT: 220 LBS | HEART RATE: 83 BPM | SYSTOLIC BLOOD PRESSURE: 117 MMHG | BODY MASS INDEX: 30.8 KG/M2 | OXYGEN SATURATION: 97 %

## 2022-03-24 DIAGNOSIS — M54.2 NECK PAIN: ICD-10-CM

## 2022-03-24 DIAGNOSIS — Z98.890 STATUS POST CERVICAL DISCECTOMY: ICD-10-CM

## 2022-03-24 PROCEDURE — 99203 OFFICE O/P NEW LOW 30 MIN: CPT | Performed by: SURGERY

## 2022-03-24 NOTE — NURSING NOTE
Neurosurgery consultation was requested by: Dr. Kulkarni   Pain: denies neck pain   Radicular Pain is present: right arm pain   Lhermitte sign: no  Motor complaints: denies weakness  Sensory complaints: numbness in 4th and 5th fingers on left hand   Gait and balance issues: no  Bowel or bladder issues: no  Duration of SX is: a year   The symptoms are worse with: constant   The symptoms are better with: medrol dose pack   Injury: no   Severity is: moderate  Patient has tried the following conservative measures: he has done PT but did not help  NDI score is :   ESTEPHANIA Ortega

## 2022-03-24 NOTE — PROGRESS NOTES
NEUROSURGERY CONSULTATION NOTE    Neurosurgery was asked to see this patient by Dr Kulkarni for evaluation of cervical radiculopathy.     CONSULTATION ASSESSMENT AND PLAN:    58 yo male who is status post C5-6 arthroplasty with Dr. Lopez on 8/31/20 for C5-6 disc herniation who presents with right arm pain for last year. MRI of his cervical spine shows past artificial disc replacement at C5-6. Mild bilateral foraminal narrowing at this level. Also has bilateral foraminal narrowing at C4-5 mild in nature. We discussed symptoms appear most likely in right C5 distribution. Right now symptoms improved after medrol dose pack. If symptoms worsen recommend a right C4-5 TFESI. He will return to clinic if symptoms worsen or he develops new neurological symptoms.     I spent more than 30 minutes in this apt, examining the pt, reviewing the scans, reviewing notes from chart, discussing treatment options with risks and benefits and coordinating care.     Esperanza Ferrer MD      HPI:  58 yo male who is status post C5-6 arthroplasty with Dr. Lopez on 8/31/20 for C5-6 disc herniation who presents with right arm pain for last year. Mostly in the right shoulder down to his elbow. This really has progressed in the last month. Aching mostly centered around antecubital region and shoulder. Feels weak when he is having the pain. Hold the arm at 90 degree at elbow to improve symptoms. Difficulty laying flat aggravates his symptoms. 4th and 5th digit numbness on left only otherwise no left arm symptoms.     Recently took a medrol dose pack with great improvement of his symptoms. Currently symptoms are not that bothersome.   Does home exercises from past physical therapy without relief.   Has tried a pain medication and muscle relaxant as well. Helped a little bit      Past medical history-  Cervical radiculopathy      Past Surgical History:   Procedure Laterality Date     CERVICAL ARTHOPLASTY/ ARTIFIC DISC, SINGLE       plantar  Fachitiis surgery Left        REVIEW OF SYSTEMS:  Denies history of anesthetic reactions.     MEDICATIONS:  Current Outpatient Medications   Medication Sig Dispense Refill     methylPREDNISolone (MEDROL DOSEPAK) 4 MG tablet therapy pack Follow Package Directions 21 tablet 0     omeprazole (PRILOSEC) 20 MG DR capsule Take 1 capsule (20 mg) by mouth daily 90 capsule 3     pravastatin (PRAVACHOL) 40 MG tablet Take 1 tablet (40 mg) by mouth daily 90 tablet 3     tadalafil (CIALIS) 20 MG tablet Take 1 tablet (20 mg) by mouth daily as needed (erectile dysfunction) 6 tablet 3         ALLERGIES/SENSITIVITIES:     No Known Allergies    PERTINENT SOCIAL HISTORY:   Social History     Socioeconomic History     Marital status:      Spouse name: Not on file     Number of children: Not on file     Years of education: Not on file     Highest education level: Not on file   Occupational History     Not on file   Tobacco Use     Smoking status: Never Smoker     Smokeless tobacco: Never Used   Vaping Use     Vaping Use: Never used   Substance and Sexual Activity     Alcohol use: Not Currently     Drug use: Never     Sexual activity: Yes     Partners: Female     Birth control/protection: Female Surgical   Other Topics Concern     Not on file   Social History Narrative     Not on file     Social Determinants of Health     Financial Resource Strain: Not on file   Food Insecurity: Not on file   Transportation Needs: Not on file   Physical Activity: Not on file   Stress: Not on file   Social Connections: Not on file   Intimate Partner Violence: Not on file   Housing Stability: Not on file         FAMILY HISTORY:  Family History   Problem Relation Age of Onset     Brain Tumor Mother      Other - See Comments Sister         iron lung     No Known Problems Maternal Grandfather      No Known Problems Paternal Grandmother      Alcoholism Sister      No Known Problems Paternal Grandfather         PHYSICAL EXAM:   Constitutional: /85   " Pulse 83   Ht 5' 10.75\" (1.797 m)   Wt 220 lb (99.8 kg)   SpO2 97%   BMI 30.90 kg/m       Mental Status: A & O in no acute distress.  Affect is appropriate.  Speech is fluent.  Recent and remote memory are intact.  Attention span and concentration are normal.     Motor: Normal bulk and tone all muscle groups of upper and lower extremities.    Strength: 5/5x4     Sensory: Sensation intact bilaterally to light touch.     Coordination: Heel/toe/tandem gait intact.  Normal gait and station.     Reflexes: supinator, biceps, triceps, knee/ ankle jerk intact. No portillo's/babinski/ clonus.    IMAGING:  I personally reviewed all radiographic images         Cc:   Ari Kulkarni           "

## 2022-03-24 NOTE — LETTER
3/24/2022         RE: John Guzman  [address] [facility] [address]        Dear Colleague,    Thank you for referring your patient, John Guzman, to the [facility] NEUROSURGERY CLINIC [facility]. Please see a copy of my visit note below.    NEUROSURGERY CONSULTATION NOTE    Neurosurgery was asked to see this patient by Dr Kulkarni for evaluation of cervical radiculopathy.     CONSULTATION ASSESSMENT AND PLAN:    56 yo male who is status post C5-6 arthroplasty with Dr. Lopez on 8/31/20 for C5-6 disc herniation who presents with right arm pain for last year. MRI of his cervical spine shows past artificial disc replacement at C5-6. Mild bilateral foraminal narrowing at this level. Also has bilateral foraminal narrowing at C4-5 mild in nature. We discussed symptoms appear most likely in right C5 distribution. Right now symptoms improved after medrol dose pack. If symptoms worsen recommend a right C4-5 TFESI. He will return to clinic if symptoms worsen or he develops new neurological symptoms.     I spent more than 30 minutes in this apt, examining the pt, reviewing the scans, reviewing notes from chart, discussing treatment options with risks and benefits and coordinating care.     Esperanza Ferrer MD      HPI:  56 yo male who is status post C5-6 arthroplasty with Dr. Lopez on 8/31/20 for C5-6 disc herniation who presents with right arm pain for last year. Mostly in the right shoulder down to his elbow. This really has progressed in the last month. Aching mostly centered around antecubital region and shoulder. Feels weak when he is having the pain. Hold the arm at 90 degree at elbow to improve symptoms. Difficulty laying flat aggravates his symptoms. 4th and 5th digit numbness on left only otherwise no left arm symptoms.     Recently took a medrol dose pack with great improvement of his symptoms. Currently symptoms are not that bothersome.   Does home exercises from past physical therapy  without relief.   Has tried a pain medication and muscle relaxant as well. Helped a little bit      Past medical history-  Cervical radiculopathy      Past Surgical History:   Procedure Laterality Date     CERVICAL ARTHOPLASTY/ ARTIFIC DISC, SINGLE       plantar Fachitiis surgery Left        REVIEW OF SYSTEMS:  Denies history of anesthetic reactions.     MEDICATIONS:  Current Outpatient Medications   Medication Sig Dispense Refill     methylPREDNISolone (MEDROL DOSEPAK) 4 MG tablet therapy pack Follow Package Directions 21 tablet 0     omeprazole (PRILOSEC) 20 MG DR capsule Take 1 capsule (20 mg) by mouth daily 90 capsule 3     pravastatin (PRAVACHOL) 40 MG tablet Take 1 tablet (40 mg) by mouth daily 90 tablet 3     tadalafil (CIALIS) 20 MG tablet Take 1 tablet (20 mg) by mouth daily as needed (erectile dysfunction) 6 tablet 3         ALLERGIES/SENSITIVITIES:     No Known Allergies    PERTINENT SOCIAL HISTORY:   Social History     Socioeconomic History     Marital status:      Spouse name: Not on file     Number of children: Not on file     Years of education: Not on file     Highest education level: Not on file   Occupational History     Not on file   Tobacco Use     Smoking status: Never Smoker     Smokeless tobacco: Never Used   Vaping Use     Vaping Use: Never used   Substance and Sexual Activity     Alcohol use: Not Currently     Drug use: Never     Sexual activity: Yes     Partners: Female     Birth control/protection: Female Surgical   Other Topics Concern     Not on file   Social History Narrative     Not on file     Social Determinants of Health     Financial Resource Strain: Not on file   Food Insecurity: Not on file   Transportation Needs: Not on file   Physical Activity: Not on file   Stress: Not on file   Social Connections: Not on file   Intimate Partner Violence: Not on file   Housing Stability: Not on file         FAMILY HISTORY:  Family History   Problem Relation Age of Onset     Brain Tumor  "Mother      Other - See Comments Sister         iron lung     No Known Problems Maternal Grandfather      No Known Problems Paternal Grandmother      Alcoholism Sister      No Known Problems Paternal Grandfather         PHYSICAL EXAM:   Constitutional: /85   Pulse 83   Ht 5' 10.75\" (1.797 m)   Wt 220 lb (99.8 kg)   SpO2 97%   BMI 30.90 kg/m       Mental Status: A & O in no acute distress.  Affect is appropriate.  Speech is fluent.  Recent and remote memory are intact.  Attention span and concentration are normal.     Motor: Normal bulk and tone all muscle groups of upper and lower extremities.    Strength: 5/5x4     Sensory: Sensation intact bilaterally to light touch.     Coordination: Heel/toe/tandem gait intact.  Normal gait and station.     Reflexes: supinator, biceps, triceps, knee/ ankle jerk intact. No portillo's/babinski/ clonus.    IMAGING:  I personally reviewed all radiographic images         Cc:   Ari Kulkarni, thank you for allowing me to participate in the care of your patient.        Sincerely,        Esperanza Ferrer MD    "

## 2022-07-02 ENCOUNTER — HEALTH MAINTENANCE LETTER (OUTPATIENT)
Age: 57
End: 2022-07-02

## 2022-08-08 DIAGNOSIS — K22.70 BARRETT'S ESOPHAGUS WITHOUT DYSPLASIA: ICD-10-CM

## 2022-08-10 NOTE — TELEPHONE ENCOUNTER
Short refill provided. Needs office visit for future refills as not seen in over a year.    Name band;

## 2022-09-21 ENCOUNTER — OFFICE VISIT (OUTPATIENT)
Dept: PODIATRY | Facility: CLINIC | Age: 57
End: 2022-09-21
Payer: COMMERCIAL

## 2022-09-21 DIAGNOSIS — M72.2 PLANTAR FASCIITIS: ICD-10-CM

## 2022-09-21 DIAGNOSIS — Q66.71 CAVUS DEFORMITY OF RIGHT FOOT: ICD-10-CM

## 2022-09-21 PROCEDURE — 20550 NJX 1 TENDON SHEATH/LIGAMENT: CPT | Mod: 50 | Performed by: PODIATRIST

## 2022-09-21 PROCEDURE — 99203 OFFICE O/P NEW LOW 30 MIN: CPT | Mod: 25 | Performed by: PODIATRIST

## 2022-09-21 RX ORDER — TRIAMCINOLONE ACETONIDE 40 MG/ML
40 INJECTION, SUSPENSION INTRA-ARTICULAR; INTRAMUSCULAR ONCE
Status: COMPLETED | OUTPATIENT
Start: 2022-09-21 | End: 2022-09-21

## 2022-09-21 RX ORDER — DEXAMETHASONE SODIUM PHOSPHATE 4 MG/ML
4 INJECTION, SOLUTION INTRA-ARTICULAR; INTRALESIONAL; INTRAMUSCULAR; INTRAVENOUS; SOFT TISSUE ONCE
Status: COMPLETED | OUTPATIENT
Start: 2022-09-21 | End: 2022-09-21

## 2022-09-21 RX ORDER — BUPIVACAINE HYDROCHLORIDE 5 MG/ML
1 INJECTION, SOLUTION PERINEURAL ONCE
Status: COMPLETED | OUTPATIENT
Start: 2022-09-21 | End: 2022-09-21

## 2022-09-21 RX ADMIN — BUPIVACAINE HYDROCHLORIDE 5 MG: 5 INJECTION, SOLUTION PERINEURAL at 17:07

## 2022-09-21 RX ADMIN — TRIAMCINOLONE ACETONIDE 40 MG: 40 INJECTION, SUSPENSION INTRA-ARTICULAR; INTRAMUSCULAR at 17:08

## 2022-09-21 RX ADMIN — DEXAMETHASONE SODIUM PHOSPHATE 4 MG: 4 INJECTION, SOLUTION INTRA-ARTICULAR; INTRALESIONAL; INTRAMUSCULAR; INTRAVENOUS; SOFT TISSUE at 17:06

## 2022-09-21 RX ADMIN — DEXAMETHASONE SODIUM PHOSPHATE 4 MG: 4 INJECTION, SOLUTION INTRA-ARTICULAR; INTRALESIONAL; INTRAMUSCULAR; INTRAVENOUS; SOFT TISSUE at 17:05

## 2022-09-21 NOTE — PATIENT INSTRUCTIONS
"PATIENT INSTRUCTIONS - Podiatry / Foot & Ankle Surgery    Aftercare for Steroid Injection  Activity:  -Resume normal activity as tolerated.  -Tendon, ligament, fascia injectionons- avoid high-impact activity for 1 week.  Icing:  -May apply to the injection site if needed.  Infection:  -Risk is generally low (<1%), but must be aware of the signs/symptoms.    -Both infection and an inflammatory reaction to the steroid (\"steroid flare\") will cause redness, warmth, and increased pain.   -If these symptoms develop within 12-24h & resolve within 2-3 days- \"steroid flare\"- ice (non-worrisome)  -If these symptoms develop after 24-48h, progressively get worse, & are associated with a fever- possible infection; call the clinic or present to urgent care immediately          [place] [facility] LOCATIONS  [facility]  [address]  [place] [address]  Phone: [phone]  Fax: [phone] [facility]  [address]  [place] [address]  Phone: [phone]   Fax: [phone]   [facility]  [address]  [place] [address]  Phone: [phone]  Fax: [phone] [facility]  [address]  [place] [address]  Phone: [phone]   Fax: [phone]   [facility]   [address]  [place] [address]  Phone: [phone]  Fax: [phone] * Please call any location listed to make an appointment for a casting/fitting. Your referral was sent to their central office and they will all have the order on file.         Physical Therapy  You have been referred to [facility].  Locations can be found online.  Please call to schedule an appointment:  [phone]  Location that does Gr[facility] or AST[facility]          Calf muscle stretching 2-3x daily as instructed, both with the knees straight and the knees bent. Hold for 30-60 seconds.  For personal " instruction on this,  please request a Physical Therapy referral from your doctor.      Low impact activity - cycling or swimming is best; then walking or elliptical.  Avoid running, jumping, and hard landings.
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
## 2022-10-18 ENCOUNTER — HOSPITAL ENCOUNTER (OUTPATIENT)
Dept: PHYSICAL THERAPY | Facility: CLINIC | Age: 57
Setting detail: THERAPIES SERIES
Discharge: HOME | End: 2022-10-18
Attending: PODIATRIST
Payer: COMMERCIAL

## 2022-10-18 DIAGNOSIS — M72.2 PLANTAR FASCIITIS: ICD-10-CM

## 2022-10-18 DIAGNOSIS — Q66.71 CAVUS DEFORMITY OF RIGHT FOOT: ICD-10-CM

## 2022-10-18 PROCEDURE — 97110 THERAPEUTIC EXERCISES: CPT | Mod: GP | Performed by: PHYSICAL THERAPIST

## 2022-10-18 PROCEDURE — 97162 PT EVAL MOD COMPLEX 30 MIN: CPT | Mod: GP | Performed by: PHYSICAL THERAPIST

## 2022-10-18 PROCEDURE — 97140 MANUAL THERAPY 1/> REGIONS: CPT | Mod: GP | Performed by: PHYSICAL THERAPIST
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
## 2022-11-07 DIAGNOSIS — K22.70 BARRETT'S ESOPHAGUS WITHOUT DYSPLASIA: ICD-10-CM

## 2022-11-09 NOTE — TELEPHONE ENCOUNTER
Please schedule patient, I have not prescribed this before for him. Virtual visit works too to discuss      Thank you,    Ari Kulkarni MD

## 2022-11-22 NOTE — PROGRESS NOTES
Discharge Note -Physical Therapy    NAME:  John Guzman  MRN:   [id number]    S:    Pt did not follow up for therapy as recommended.    O:  Objective information is not available as pt has not returned for therapy.  Initial evaluation note will serve as final entry.    A:   Pt did not return for further treatment.    Status of goals is unknown.    P:  Discharge from PT this date.    Thank you for this referral,    Tati Manzanares, PT,   #[id number]  [facility]  [phone]

## 2022-11-30 ENCOUNTER — VIRTUAL VISIT (OUTPATIENT)
Dept: FAMILY MEDICINE | Facility: CLINIC | Age: 57
End: 2022-11-30
Payer: COMMERCIAL

## 2022-11-30 DIAGNOSIS — K22.70 BARRETT'S ESOPHAGUS WITHOUT DYSPLASIA: Primary | ICD-10-CM

## 2022-11-30 PROCEDURE — 99213 OFFICE O/P EST LOW 20 MIN: CPT | Mod: 95 | Performed by: FAMILY MEDICINE

## 2022-11-30 NOTE — PROGRESS NOTES
Rigoberto is a 57 year old who is being evaluated via a billable video visit.      How would you like to obtain your AVS? MyChart  If the video visit is dropped, the invitation should be resent by: Text to cell phone: [phone]  Will anyone else be joining your video visit? 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  - omeprazole (PRILOSEC) 20 MG DR capsule  Dispense: 90 capsule; Refill: 0      Encourage patient to set up yearly preventative exam.  May require repeat EGD for ongoing Traylor's monitoring    No follow-ups on file.    THANH MURPHY, [facility]   Rigoberto is a 57 year old accompanied by his spouse, presenting for the following health issues:  Refill Request      History of Present Illness       Reason for visit:  Refill of Omeprazole    He eats 0-1 servings of fruits and vegetables daily.He consumes 1 sweetened beverage(s) daily.He exercises with enough effort to increase his heart rate 10 to 19 minutes per day.  He exercises with enough effort to increase his heart rate 5 days per week. He is missing 1 dose(s) of medications per week.  He is not taking prescribed medications regularly due to remembering to take.     Patient reports many years of ongoing heartburn, taking omeprazole daily.  There is question of Traylor's esophagus, unsure when his last EGD was.  Requiring refills today.  Is overdue for preventative visit.     Denies abdominal pain, pain with swallowing, nausea or vomiting.      Review of Systems   Constitutional, HEENT, cardiovascular, pulmonary, gi and gu systems are negative, except as otherwise noted.      Objective           Vitals:  No vitals were obtained today due to virtual visit.    Physical Exam   GENERAL: Healthy, alert and no distress  EYES: Eyes grossly normal to inspection.  No discharge or erythema, or obvious scleral/conjunctival abnormalities.  RESP: No audible wheeze, cough, or  visible cyanosis.  No visible retractions or increased work of breathing.    SKIN: Visible skin clear. No significant rash, abnormal pigmentation or lesions.  NEURO: Cranial nerves grossly intact.  Mentation and speech appropriate for age.  PSYCH: Mentation appears normal, affect normal/bright, judgement and insight intact, normal speech and appearance well-groomed.          Video-Visit Details:    Video Start Time: 5:00 pm    Type of service:  Video Visit    Video End Time:5:20 pm    Originating Location (pt. Location): Home    Distant Location (provider location):  On-site    Platform used for Video Visit: Guera

## 2022-12-13 ENCOUNTER — OFFICE VISIT (OUTPATIENT)
Dept: PODIATRY | Facility: CLINIC | Age: 57
End: 2022-12-13
Payer: COMMERCIAL

## 2022-12-13 VITALS
SYSTOLIC BLOOD PRESSURE: 106 MMHG | BODY MASS INDEX: 30.9 KG/M2 | HEART RATE: 76 BPM | DIASTOLIC BLOOD PRESSURE: 72 MMHG | WEIGHT: 220 LBS

## 2022-12-13 DIAGNOSIS — R20.0 NUMBNESS IN FEET: Primary | ICD-10-CM

## 2022-12-13 PROCEDURE — 99214 OFFICE O/P EST MOD 30 MIN: CPT | Performed by: PODIATRIST

## 2022-12-13 NOTE — LETTER
12/13/2022         RE: John Guzman  [address] [facility] [address]        Dear Colleague,    Thank you for referring your patient, John Guzman, to the [facility]. Please see a copy of my visit note below.    Subjective:    Pt is seen today with chief complaint of numbness and burning in both feet.  It is constant and always present.  Has had this for 1 year.  Has gotten worse in the last 6 months.  Not so bothersome during the day but mostly bothers him in bed at night.  Denies weakness or increased deformity.  Denies edema erythema or ecchymosis.    ROS:  See above        No Known Allergies    Current Outpatient Medications   Medication Sig Dispense Refill     omeprazole (PRILOSEC) 20 MG DR capsule Take 1 capsule (20 mg) by mouth daily 90 capsule 0     pravastatin (PRAVACHOL) 40 MG tablet Take 1 tablet (40 mg) by mouth daily 90 tablet 3     tadalafil (CIALIS) 20 MG tablet Take 1 tablet (20 mg) by mouth daily as needed (erectile dysfunction) 6 tablet 3       Patient Active Problem List   Diagnosis     Atypical nevi     Traylor's esophagus     Benign neoplasm of rectum     Cervical disc herniation     Diverticulosis of colon, acquired     Erectile dysfunction     Gastroesophageal reflux disease without esophagitis     Hyperlipidemia     Impaired fasting glucose     Lumbar arthropathy     Moderate obesity     Pronation of feet     Vitamin D deficiency     Prediabetes     Obesity (BMI 30-39.9)     Chronic right shoulder pain     Status post cervical discectomy       Past Medical History:   Diagnosis Date     High cholesterol        Past Surgical History:   Procedure Laterality Date     CERVICAL ARTHOPLASTY/ ARTIFIC DISC, SINGLE       plantar Fachitiis surgery Left        Family History   Problem Relation Age of Onset     Brain Tumor Mother      Other - See Comments Sister         iron lung     No Known Problems Maternal Grandfather      No Known Problems Paternal Grandmother       Alcoholism Sister      No Known Problems Paternal Grandfather        Social History     Tobacco Use     Smoking status: Former     Smokeless tobacco: Never   Substance Use Topics     Alcohol use: Not Currently         Exam:    Vitals: /72   Pulse 76   Wt 99.8 kg (220 lb)   BMI 30.90 kg/m    BMI: Body mass index is 30.9 kg/m .  Height: Data Unavailable    Constitutional/ general:  Pt is in no apparent distress, appears well-nourished.  Cooperative with history and physical exam.     Psych:  The patient answered questions appropriately.  Normal affect.  Seems to have reasonable expectations, in terms of treatment.     Eyes:  Visual scanning/ tracking without deficit.     Ears:  Response to auditory stimuli is normal.  negative hearing aid devices.  Auricles in proper alignment.     Lymphatic:  Popliteal lymph nodes not enlarged.     Lungs:  Non labored breathing, non labored speech. No cough.  No audible wheezing. Even, quiet breathing.       Vascular:  positive pedal pulses bilaterally for both the DP and PT arteries.  CFT < 3 sec.   positive pedal hair growth.    Neuro:  Alert and oriented x 3. Coordinated gait.  Light touch sensation is diminished on toes.  Motor strength 5/5 in all compartments.  Negative Janice's click.  Negative Tinel sign.  No ankle clonus.  Babinski normal.  Patellar reflexes normal and symmetrical bilaterally.    Derm: Normal texture and turgor.  No erythema, ecchymosis, or cyanosis.  No signs of skin breakdown or ulcers.       Musculoskeletal:    Lower extremity muscle strength is normal.  Patient is ambulatory without an assistive device or brace.  No gross deformities.  Cavus arch with weightbearing.  No forefoot or rear foot deformities noted.  MS 5/5 all compartments.   No erythema or ecchymosis noted bilateral.  No subcutaneous masses noted.  No pain on palpation anywhere bilateral.       Latest Reference Range & Units 07/21/21 07:50   Vitamin B12 193 - 986 pg/mL 709       Latest Reference Range & Units 03/08/22 13:42   Hemoglobin A1C 0.0 - 5.6 % 5.9 (H)   (H): Data is abnormally high     Latest Reference Range & Units Most Recent   TSH 0.40 - 4.00 mU/L 2.50  7/21/21 07:50       Past liver function tests normal    A/P  Peripheral neuropathy causing pain    Reviewed recent labs.  Reviewed past podiatry note.  Discussed with patient symptoms suggestive of peripheral neuropathy.  Explained more common causes.  Discussed this could be idiopathic.  Patient would like further work-up.  Will refer patient to neurology.  RTC as needed.    Gurjit Irvin DPM, FACFAS                        Again, thank you for allowing me to participate in the care of your patient.        Sincerely,        Gurijt Irvin DPM

## 2022-12-13 NOTE — PATIENT INSTRUCTIONS
We wish you continued good healing. If you have any questions or concerns, please do not hesitate to contact us at  [phone]    [employer] (secure e-mail communication and access to your chart) to send a message or to make an appointment.    Please remember to call and schedule a follow up appointment if one was recommended at your earliest convenience.     PODIATRY CLINIC HOURS  TELEPHONE NUMBER    Dr. Gurjit ROQUEPDARI [facility]        Clinics:  [facility]   [place]  Tuesday 1PM-6PM  [place]  Wednesday 745AM-330PM  [place]  Thursday/Friday 745AM-230PM       [place] APPOINTMENTS  [phone]    [place] APPOINTMENTS  [phone]        If you need a medication refill, please contact us you may need lab work and/or a follow up visit prior to your refill (i.e. Antifungal medications).  If MRI needed please call Imaging at [phone]   HOW DO I GET MY KNEE SCOOTER? Knee scooters can be picked up at ANY Medical Supply stores with your knee scooter Prescription.  OR  Bring your signed prescription to an [facility] showroom.

## 2022-12-13 NOTE — PROGRESS NOTES
Subjective:    Pt is seen today with chief complaint of numbness and burning in both feet.  It is constant and always present.  Has had this for 1 year.  Has gotten worse in the last 6 months.  Not so bothersome during the day but mostly bothers him in bed at night.  Denies weakness or increased deformity.  Denies edema erythema or ecchymosis.    ROS:  See above        No Known Allergies    Current Outpatient Medications   Medication Sig Dispense Refill     omeprazole (PRILOSEC) 20 MG DR capsule Take 1 capsule (20 mg) by mouth daily 90 capsule 0     pravastatin (PRAVACHOL) 40 MG tablet Take 1 tablet (40 mg) by mouth daily 90 tablet 3     tadalafil (CIALIS) 20 MG tablet Take 1 tablet (20 mg) by mouth daily as needed (erectile dysfunction) 6 tablet 3       Patient Active Problem List   Diagnosis     Atypical nevi     Traylor's esophagus     Benign neoplasm of rectum     Cervical disc herniation     Diverticulosis of colon, acquired     Erectile dysfunction     Gastroesophageal reflux disease without esophagitis     Hyperlipidemia     Impaired fasting glucose     Lumbar arthropathy     Moderate obesity     Pronation of feet     Vitamin D deficiency     Prediabetes     Obesity (BMI 30-39.9)     Chronic right shoulder pain     Status post cervical discectomy       Past Medical History:   Diagnosis Date     High cholesterol        Past Surgical History:   Procedure Laterality Date     CERVICAL ARTHOPLASTY/ ARTIFIC DISC, SINGLE       plantar Fachitiis surgery Left        Family History   Problem Relation Age of Onset     Brain Tumor Mother      Other - See Comments Sister         iron lung     No Known Problems Maternal Grandfather      No Known Problems Paternal Grandmother      Alcoholism Sister      No Known Problems Paternal Grandfather        Social History     Tobacco Use     Smoking status: Former     Smokeless tobacco: Never   Substance Use Topics     Alcohol use: Not Currently         Exam:    Vitals: /72    Pulse 76   Wt 99.8 kg (220 lb)   BMI 30.90 kg/m    BMI: Body mass index is 30.9 kg/m .  Height: Data Unavailable    Constitutional/ general:  Pt is in no apparent distress, appears well-nourished.  Cooperative with history and physical exam.     Psych:  The patient answered questions appropriately.  Normal affect.  Seems to have reasonable expectations, in terms of treatment.     Eyes:  Visual scanning/ tracking without deficit.     Ears:  Response to auditory stimuli is normal.  negative hearing aid devices.  Auricles in proper alignment.     Lymphatic:  Popliteal lymph nodes not enlarged.     Lungs:  Non labored breathing, non labored speech. No cough.  No audible wheezing. Even, quiet breathing.       Vascular:  positive pedal pulses bilaterally for both the DP and PT arteries.  CFT < 3 sec.   positive pedal hair growth.    Neuro:  Alert and oriented x 3. Coordinated gait.  Light touch sensation is diminished on toes.  Motor strength 5/5 in all compartments.  Negative Janice's click.  Negative Tinel sign.  No ankle clonus.  Babinski normal.  Patellar reflexes normal and symmetrical bilaterally.    Derm: Normal texture and turgor.  No erythema, ecchymosis, or cyanosis.  No signs of skin breakdown or ulcers.       Musculoskeletal:    Lower extremity muscle strength is normal.  Patient is ambulatory without an assistive device or brace.  No gross deformities.  Cavus arch with weightbearing.  No forefoot or rear foot deformities noted.  MS 5/5 all compartments.   No erythema or ecchymosis noted bilateral.  No subcutaneous masses noted.  No pain on palpation anywhere bilateral.       Latest Reference Range & Units 07/21/21 07:50   Vitamin B12 193 - 986 pg/mL 709      Latest Reference Range & Units 03/08/22 13:42   Hemoglobin A1C 0.0 - 5.6 % 5.9 (H)   (H): Data is abnormally high     Latest Reference Range & Units Most Recent   TSH 0.40 - 4.00 mU/L 2.50  7/21/21 07:50       Past liver function tests  normal    A/P  Peripheral neuropathy causing pain    Reviewed recent labs.  Reviewed past podiatry note.  Discussed with patient symptoms suggestive of peripheral neuropathy.  Explained more common causes.  Discussed this could be idiopathic.  Patient would like further work-up.  Will refer patient to neurology.  RTC as needed.    Gurjit Irvin DPM, FACFAS

## 2023-01-07 ENCOUNTER — HEALTH MAINTENANCE LETTER (OUTPATIENT)
Age: 58
End: 2023-01-07

## 2023-02-24 DIAGNOSIS — K22.70 BARRETT'S ESOPHAGUS WITHOUT DYSPLASIA: ICD-10-CM

## 2023-03-15 ENCOUNTER — OFFICE VISIT (OUTPATIENT)
Dept: FAMILY MEDICINE | Facility: CLINIC | Age: 58
End: 2023-03-15
Payer: COMMERCIAL

## 2023-03-15 VITALS
BODY MASS INDEX: 32.04 KG/M2 | HEIGHT: 70 IN | TEMPERATURE: 97 F | OXYGEN SATURATION: 98 % | RESPIRATION RATE: 16 BRPM | HEART RATE: 81 BPM | DIASTOLIC BLOOD PRESSURE: 74 MMHG | WEIGHT: 223.8 LBS | SYSTOLIC BLOOD PRESSURE: 128 MMHG

## 2023-03-15 DIAGNOSIS — Z00.00 ROUTINE GENERAL MEDICAL EXAMINATION AT A HEALTH CARE FACILITY: Primary | ICD-10-CM

## 2023-03-15 DIAGNOSIS — Z12.11 SCREENING FOR MALIGNANT NEOPLASM OF COLON: ICD-10-CM

## 2023-03-15 DIAGNOSIS — Z12.5 SCREENING FOR PROSTATE CANCER: ICD-10-CM

## 2023-03-15 DIAGNOSIS — R73.03 PREDIABETES: ICD-10-CM

## 2023-03-15 DIAGNOSIS — E78.2 MIXED HYPERLIPIDEMIA: ICD-10-CM

## 2023-03-15 DIAGNOSIS — K22.70 BARRETT'S ESOPHAGUS WITHOUT DYSPLASIA: ICD-10-CM

## 2023-03-15 PROCEDURE — 99396 PREV VISIT EST AGE 40-64: CPT | Performed by: FAMILY MEDICINE

## 2023-03-15 PROCEDURE — 99214 OFFICE O/P EST MOD 30 MIN: CPT | Mod: 25 | Performed by: FAMILY MEDICINE

## 2023-03-15 ASSESSMENT — ENCOUNTER SYMPTOMS
CHILLS: 0
WEAKNESS: 0
HEMATOCHEZIA: 0
DYSURIA: 0
NERVOUS/ANXIOUS: 0
DIARRHEA: 0
DIZZINESS: 0
FEVER: 0
FREQUENCY: 0
COUGH: 0
MYALGIAS: 1
EYE PAIN: 0
HEARTBURN: 1
NAUSEA: 0
JOINT SWELLING: 0
SORE THROAT: 0
CONSTIPATION: 0
PALPITATIONS: 0
PARESTHESIAS: 0
HEMATURIA: 0
ABDOMINAL PAIN: 0
SHORTNESS OF BREATH: 0
HEADACHES: 1
ARTHRALGIAS: 1

## 2023-03-15 ASSESSMENT — PAIN SCALES - GENERAL: PAINLEVEL: EXTREME PAIN (8)

## 2023-03-15 NOTE — PROGRESS NOTES
SUBJECTIVE:   CC: Rigoberto is an 58 year old who presents for preventative health visit.   Patient has been advised of split billing requirements and indicates understanding: Yes  Healthy Habits:     Getting at least 3 servings of Calcium per day:  Yes    Bi-annual eye exam:  NO    Dental care twice a year:  Yes    Sleep apnea or symptoms of sleep apnea:  None    Diet:  Regular (no restrictions)    Frequency of exercise:  4-5 days/week    Duration of exercise:  15-30 minutes    Taking medications regularly:  Yes    Barriers to taking medications:  None    Medication side effects:  None    PHQ-2 Total Score: 0    Additional concerns today:  No    Concerns about heartburn, has been taking omeprazole for about 10 years, meds do keep the symptoms under control.  Had EGD about 10 years ago or so. That's when he started PPI.  Patient states if he misses dose of PPI, symptoms recur shortly.  Worse when he lays on bed.  Does not recall test for H pylori.    Today's PHQ-2 Score:   PHQ-2 ( 1999 Pf[employer]) 3/15/2023   Q1: Little interest or pleasure in doing things 0   Q2: Feeling down, depressed or hopeless 0   PHQ-2 Score 0   Q1: Little interest or pleasure in doing things Not at all   Q2: Feeling down, depressed or hopeless Not at all   PHQ-2 Score 0       Have you ever done Advance Care Planning? (For example, a Health Directive, POLST, or a discussion with a medical provider or your loved ones about your wishes): No, advance care planning information given to patient to review.  Patient declined advance care planning discussion at this time.    Social History     Tobacco Use     Smoking status: Former     Smokeless tobacco: Never   Substance Use Topics     Alcohol use: Not Currently         Alcohol Use 3/15/2023   Prescreen: >3 drinks/day or >7 drinks/week? Not Applicable   No flowsheet data found.    Last PSA: No results found for: PSA    Reviewed orders with patient. Reviewed health maintenance and updated orders accordingly -  Yes  Patient Active Problem List   Diagnosis     Atypical nevi     Traylor's esophagus     Benign neoplasm of rectum     Cervical disc herniation     Diverticulosis of colon, acquired     Erectile dysfunction     Gastroesophageal reflux disease without esophagitis     Hyperlipidemia     Impaired fasting glucose     Lumbar arthropathy     Moderate obesity     Pronation of feet     Vitamin D deficiency     Prediabetes     Obesity (BMI 30-39.9)     Chronic right shoulder pain     Status post cervical discectomy     Past Surgical History:   Procedure Laterality Date     CERVICAL ARTHOPLASTY/ ARTIFIC DISC, SINGLE       plantar Fachitiis surgery Left        Social History     Tobacco Use     Smoking status: Former     Smokeless tobacco: Never   Substance Use Topics     Alcohol use: Not Currently      Smoked 1-2 packs a day.   Quit smoking 27 years ago. Family History   Problem Relation Age of Onset     Brain Tumor Mother      Other - See Comments Sister         iron lung     No Known Problems Maternal Grandfather      No Known Problems Paternal Grandmother      Alcoholism Sister      No Known Problems Paternal Grandfather          Current Outpatient Medications   Medication Sig Dispense Refill     omeprazole (PRILOSEC) 20 MG DR capsule Take 1 capsule (20 mg) by mouth daily 90 capsule 0     pravastatin (PRAVACHOL) 40 MG tablet Take 1 tablet (40 mg) by mouth daily 90 tablet 3     tadalafil (CIALIS) 20 MG tablet Take 1 tablet (20 mg) by mouth daily as needed (erectile dysfunction) 6 tablet 3     No Known Allergies    Reviewed and updated as needed this visit by clinical staff   Tobacco  Allergies  Meds              Reviewed and updated as needed this visit by Provider     Meds             Past Medical History:   Diagnosis Date     High cholesterol       Past Surgical History:   Procedure Laterality Date     CERVICAL ARTHOPLASTY/ ARTIFIC DISC, SINGLE       plantar Fachitiis surgery Left        Review of Systems  "  Constitutional: Negative for chills and fever.   HENT: Positive for ear pain and hearing loss. Negative for congestion and sore throat.    Eyes: Negative for pain and visual disturbance.   Respiratory: Negative for cough and shortness of breath.    Cardiovascular: Negative for chest pain, palpitations and peripheral edema.   Gastrointestinal: Positive for heartburn. Negative for abdominal pain, constipation, diarrhea, hematochezia and nausea.   Genitourinary: Positive for impotence. Negative for dysuria, frequency, genital sores, hematuria, penile discharge and urgency.   Musculoskeletal: Positive for arthralgias and myalgias. Negative for joint swelling.   Skin: Negative for rash.   Neurological: Positive for headaches. Negative for dizziness, weakness and paresthesias.   Psychiatric/Behavioral: Negative for mood changes. The patient is not nervous/anxious.          OBJECTIVE:   /74 (BP Location: Left arm, Patient Position: Chair, Cuff Size: Adult Large)   Pulse 81   Temp 97  F (36.1  C) (Tympanic)   Resp 16   Ht 1.772 m (5' 9.75\")   Wt 101.5 kg (223 lb 12.8 oz)   SpO2 98%   BMI 32.34 kg/m      Physical Exam  GENERAL APPEARANCE: healthy, alert and no distress  EYES: pink conj, no icterus, PERRL, EOMI  HENT: ear canals and TM's normal, nose and mouth without ulcers or lesions, oropharynx clear and oral mucous membranes moist  NECK: no adenopathy, no asymmetry, masses, or scars and thyroid normal to palpation  RESP: lungs clear to auscultation - no rales, rhonchi or wheezes  CV: regular rates and rhythm, normal S1 S2, no S3 or S4, no murmur, click or rub, no peripheral edema and peripheral pulses strong  ABDOMEN: soft, nontender, no hepatosplenomegaly, no masses and bowel sounds normal  RECTAL: deferred  MS: no musculoskeletal defects are noted and gait is age appropriate without ataxia  SKIN: no suspicious lesions or rashes  NEURO: Normal strength and tone, sensory exam grossly normal, mentation " "intact and speech normal    Diagnostic Test Results:  none     ASSESSMENT/PLAN:   John was seen today for physical.    Diagnoses and all orders for this visit:    Routine general medical examination at a health care facility  Patient was advised on recommended screening and preventive health recommendations.  He verbalized understanding and agreed to the plans below.  He deferred due immunizations.    Traylor's esophagus without dysplasia  -     Adult GI  Referral - Procedure Only; Future  Advised need for surveillance of the condition. He agreed to pursue EGD.  Continue PPI for now.  Consider H pylori testing although this may be sampled at the EGD.  Consider GI consult.  Return precautions discussed and given to patient.    Mixed hyperlipidemia  -     Lipid panel reflex to direct LDL Fasting; Future  -     ALT; Future  Reinforced heart healthy lifestyle.  Continue pravastatin for now.  Consider fenofibrate if triglyceride is very high.  Return precautions discussed and given to patient.     Prediabetes  -     Basic metabolic panel; Future  -     Hemoglobin A1c; Future  Advised weight  management.  Advised carb control.    Screening for prostate cancer  -     Prostate Specific Antigen Screen; Future    Screening for malignant neoplasm of colon  -     Colonoscopy Screening  Referral; Future    Other orders  -     REVIEW OF HEALTH MAINTENANCE PROTOCOL ORDERS        Patient has been advised of split billing requirements and indicates understanding: Yes      COUNSELING:   Reviewed preventive health counseling, as reflected in patient instructions      BMI:   Estimated body mass index is 32.34 kg/m  as calculated from the following:    Height as of this encounter: 1.772 m (5' 9.75\").    Weight as of this encounter: 101.5 kg (223 lb 12.8 oz).   Weight management plan: Discussed healthy diet and exercise guidelines      He reports that he has quit smoking. He has never used smokeless " tobacco.        Gideon Abebe MD  [facility]

## 2023-03-20 ENCOUNTER — TELEPHONE (OUTPATIENT)
Dept: SURGERY | Facility: CLINIC | Age: 58
End: 2023-03-20
Payer: COMMERCIAL

## 2023-03-20 NOTE — TELEPHONE ENCOUNTER
Screening Questions  BLUE  KIND OF PREP RED  LOCATION [review exclusion criteria] GREEN  SEDATION TYPE        Y Are you active on mychart?       Jos  Ordering/Referring Provider?        [facility] What type of coverage do you have?      N Have you had a positive covid test in the last 14 days?     32.34 1. BMI  [BMI 40+ - review exclusion criteria]    Y  2. Are you able to give consent for your medical care? [IF NO,RN REVIEW]          N  3. Are you taking any prescription pain medications on a routine schedule   (ex narcotics: oxycodone, roxicodone, oxycontin,  and percocet)? [RN Review]        N  3a. EXTENDED PREP What kind of prescription?     N 4. Do you have any chemical dependencies such as alcohol, street drugs, or methadone?        **If yes 3- 5 , please schedule with MAC sedation.**          IF YES TO ANY 6 - 10 - HOSPITAL SETTING ONLY.     N 6.   Do you need assistance transferring?     N 7.   Have you had a heart or lung transplant?    N 8.   Are you currently on dialysis?   N 9.   Do you use daily home oxygen?   N 10. Do you take nitroglycerin?   10a. N If yes, how often?     11. [FEMALES]  N Are you currently pregnant?    11a. N If yes, how many weeks? [ Greater than 12 weeks, OR NEEDED]    N 12. Do you have Pulmonary Hypertension? *NEED PAC APPT AT [facility] w/ MAC*     N 13. [review exclusion criteria]  Do you have any implantable devices in your body (pacemaker, defib, LVAD)?    N 14. In the past 6 months, have you had any heart related issues including cardiomyopathy or heart attack?     14a. N If yes, did it require cardiac stenting if so when?     N 15. Have you had a stroke or Transient ischemic attack (TIA - aka  mini stroke ) within 6 months?      N 16. Do you have mod to severe Obstructive Sleep Apnea?  [Hospital only]    N 17. Do you have SEVERE AND UNCONTROLLED asthma? *NEED PAC APPT AT [facility] w/MAC*     18. Are you currently taking any blood thinners?     18a. No. Continue to  "19.   18b. Yes/no Blood Thinner: No [CONTINUE TO #19]    N 19. Do you take the medication Phentermine?    19a. If yes, \"Hold for 7 days before procedure.  Please consult your prescribing provider if you have questions about holding this medication.\"     N  20. Do you have chronic kidney disease?      N  21. Do you have a diagnosis of diabetes?     N  22. On a regular basis do you go 3-5 days between bowel movements?     See below 23. Preferred LOCAL Pharmacy for Pre Prescription    [ LIST ONLY ONE PHARMACY]     [facility] [address] [place] [facility] [place] [address]        - CLOSING REMINDERS -    Informed patient they will need an adult    Cannot take any type of public or medical transportation alone    Conscious Sedation- Needs  for 6 hours after the procedure       MAC/General-Needs  for 24 hours after procedure    Pre-Procedure Covid test to be completed [[facility] PCR Testing Required]    Confirmed Nurse will call to complete assessment       - SCHEDULING DETAILS -  N Hospital Setting Required? If yes, what is the exclusion?: JOSE Acosta  Surgeon    4-7-23  Date of Procedure  Upper and Lower Endoscopy [EGD and Colonoscopy]  Type of Procedure Scheduled  [facility] GOLY[facility]-If you answer yes to questions #8, #20, #21Which Colonoscopy Prep was Sent?     GEN Sedation Type     N PAC / Pre-op Required                 "

## 2023-03-29 ENCOUNTER — LAB (OUTPATIENT)
Dept: LAB | Facility: CLINIC | Age: 58
End: 2023-03-29
Payer: COMMERCIAL

## 2023-03-29 DIAGNOSIS — E78.2 MIXED HYPERLIPIDEMIA: ICD-10-CM

## 2023-03-29 DIAGNOSIS — R73.03 PREDIABETES: ICD-10-CM

## 2023-03-29 DIAGNOSIS — Z12.5 SCREENING FOR PROSTATE CANCER: ICD-10-CM

## 2023-03-29 LAB
ALT SERPL W P-5'-P-CCNC: 34 U/L (ref 10–50)
ANION GAP SERPL CALCULATED.3IONS-SCNC: 12 MMOL/L (ref 7–15)
BUN SERPL-MCNC: 11.9 MG/DL (ref 6–20)
CALCIUM SERPL-MCNC: 9.3 MG/DL (ref 8.6–10)
CHLORIDE SERPL-SCNC: 103 MMOL/L (ref 98–107)
CHOLEST SERPL-MCNC: 193 MG/DL
CREAT SERPL-MCNC: 1 MG/DL (ref 0.67–1.17)
DEPRECATED HCO3 PLAS-SCNC: 23 MMOL/L (ref 22–29)
GFR SERPL CREATININE-BSD FRML MDRD: 88 ML/MIN/1.73M2
GLUCOSE SERPL-MCNC: 122 MG/DL (ref 70–99)
HBA1C MFR BLD: 5.8 % (ref 0–5.6)
HDLC SERPL-MCNC: 36 MG/DL
LDLC SERPL CALC-MCNC: 121 MG/DL
NONHDLC SERPL-MCNC: 157 MG/DL
POTASSIUM SERPL-SCNC: 4.2 MMOL/L (ref 3.4–5.3)
PSA SERPL DL<=0.01 NG/ML-MCNC: 2.86 NG/ML (ref 0–3.5)
SODIUM SERPL-SCNC: 138 MMOL/L (ref 136–145)
TRIGL SERPL-MCNC: 178 MG/DL

## 2023-03-29 PROCEDURE — G0103 PSA SCREENING: HCPCS

## 2023-03-29 PROCEDURE — 84460 ALANINE AMINO (ALT) (SGPT): CPT

## 2023-03-29 PROCEDURE — 80048 BASIC METABOLIC PNL TOTAL CA: CPT

## 2023-03-29 PROCEDURE — 83036 HEMOGLOBIN GLYCOSYLATED A1C: CPT

## 2023-03-29 PROCEDURE — 36415 COLL VENOUS BLD VENIPUNCTURE: CPT

## 2023-03-29 PROCEDURE — 80061 LIPID PANEL: CPT

## 2023-03-30 ENCOUNTER — MYC MEDICAL ADVICE (OUTPATIENT)
Dept: FAMILY MEDICINE | Facility: CLINIC | Age: 58
End: 2023-03-30
Payer: COMMERCIAL

## 2023-03-30 DIAGNOSIS — E78.5 HYPERLIPIDEMIA, UNSPECIFIED HYPERLIPIDEMIA TYPE: ICD-10-CM

## 2023-03-30 RX ORDER — BISACODYL 5 MG/1
TABLET, DELAYED RELEASE ORAL
Qty: 4 TABLET | Refills: 0 | Status: SHIPPED | OUTPATIENT
Start: 2023-03-30 | End: 2023-04-07

## 2023-03-30 RX ORDER — PRAVASTATIN SODIUM 40 MG
40 TABLET ORAL DAILY
Qty: 90 TABLET | Refills: 3 | Status: SHIPPED | OUTPATIENT
Start: 2023-03-30 | End: 2023-11-22

## 2023-03-30 NOTE — RESULT ENCOUNTER NOTE
The 10-year ASCVD risk score (Pawel VAZQUEZ, et al., 2019) is: 8.4%    Values used to calculate the score:      Age: 58 years      Sex: Male      Is Non- : No      Diabetic: No      Tobacco smoker: No      Systolic Blood Pressure: 128 mmHg      Is BP treated: No      HDL Cholesterol: 36 mg/dL      Total Cholesterol: 193 mg/dL

## 2023-03-30 NOTE — TELEPHONE ENCOUNTER
Dr Abebe  [facility]  Pt reports only takes statin 3x/wk due to issues with his feet (pt reports provider ok'd).   Unsure if you were aware with his lipid results.     Also pt needs refill. Pended        Nicolas Lopez RN

## 2023-04-03 ENCOUNTER — ANESTHESIA EVENT (OUTPATIENT)
Dept: GASTROENTEROLOGY | Facility: CLINIC | Age: 58
End: 2023-04-03
Payer: COMMERCIAL

## 2023-04-03 ASSESSMENT — LIFESTYLE VARIABLES: TOBACCO_USE: 1

## 2023-04-07 ENCOUNTER — ANESTHESIA (OUTPATIENT)
Dept: GASTROENTEROLOGY | Facility: CLINIC | Age: 58
End: 2023-04-07
Payer: COMMERCIAL

## 2023-04-07 ENCOUNTER — HOSPITAL ENCOUNTER (OUTPATIENT)
Facility: CLINIC | Age: 58
Discharge: HOME | End: 2023-04-07
Attending: SURGERY | Admitting: SURGERY
Payer: COMMERCIAL

## 2023-04-07 VITALS
TEMPERATURE: 97.3 F | DIASTOLIC BLOOD PRESSURE: 38 MMHG | WEIGHT: 223.8 LBS | HEIGHT: 70 IN | OXYGEN SATURATION: 97 % | SYSTOLIC BLOOD PRESSURE: 98 MMHG | BODY MASS INDEX: 32.04 KG/M2 | HEART RATE: 67 BPM | RESPIRATION RATE: 16 BRPM

## 2023-04-07 DIAGNOSIS — Z12.11 SPECIAL SCREENING FOR MALIGNANT NEOPLASMS, COLON: Primary | ICD-10-CM

## 2023-04-07 LAB
COLONOSCOPY: NORMAL
UPPER GI ENDOSCOPY: NORMAL

## 2023-04-07 PROCEDURE — 45385 COLONOSCOPY W/LESION REMOVAL: CPT | Performed by: SURGERY

## 2023-04-07 PROCEDURE — 250N000009 HC RX 250: Performed by: NURSE ANESTHETIST, CERTIFIED REGISTERED

## 2023-04-07 PROCEDURE — 45380 COLONOSCOPY AND BIOPSY: CPT | Mod: 59 | Performed by: SURGERY

## 2023-04-07 PROCEDURE — 370N000017 HC ANESTHESIA TECHNICAL FEE, PER MIN: Performed by: SURGERY

## 2023-04-07 PROCEDURE — 250N000009 HC RX 250: Performed by: SURGERY

## 2023-04-07 PROCEDURE — 43239 EGD BIOPSY SINGLE/MULTIPLE: CPT | Mod: 51 | Performed by: SURGERY

## 2023-04-07 PROCEDURE — 43239 EGD BIOPSY SINGLE/MULTIPLE: CPT | Performed by: SURGERY

## 2023-04-07 PROCEDURE — 45380 COLONOSCOPY AND BIOPSY: CPT | Mod: PT,XS

## 2023-04-07 PROCEDURE — 88305 TISSUE EXAM BY PATHOLOGIST: CPT | Mod: TC | Performed by: SURGERY

## 2023-04-07 PROCEDURE — 250N000011 HC RX IP 250 OP 636: Performed by: NURSE ANESTHETIST, CERTIFIED REGISTERED

## 2023-04-07 PROCEDURE — 258N000003 HC RX IP 258 OP 636: Performed by: SURGERY

## 2023-04-07 PROCEDURE — 45385 COLONOSCOPY W/LESION REMOVAL: CPT | Mod: PT | Performed by: SURGERY

## 2023-04-07 RX ORDER — PROPOFOL 10 MG/ML
INJECTION, EMULSION INTRAVENOUS CONTINUOUS PRN
Status: DISCONTINUED | OUTPATIENT
Start: 2023-04-07 | End: 2023-04-07

## 2023-04-07 RX ORDER — NALOXONE HYDROCHLORIDE 0.4 MG/ML
0.4 INJECTION, SOLUTION INTRAMUSCULAR; INTRAVENOUS; SUBCUTANEOUS
Status: DISCONTINUED | OUTPATIENT
Start: 2023-04-07 | End: 2023-04-07 | Stop reason: HOSPADM

## 2023-04-07 RX ORDER — NALOXONE HYDROCHLORIDE 0.4 MG/ML
0.2 INJECTION, SOLUTION INTRAMUSCULAR; INTRAVENOUS; SUBCUTANEOUS
Status: DISCONTINUED | OUTPATIENT
Start: 2023-04-07 | End: 2023-04-07 | Stop reason: HOSPADM

## 2023-04-07 RX ORDER — LIDOCAINE HYDROCHLORIDE 20 MG/ML
INJECTION, SOLUTION INFILTRATION; PERINEURAL PRN
Status: DISCONTINUED | OUTPATIENT
Start: 2023-04-07 | End: 2023-04-07

## 2023-04-07 RX ORDER — ONDANSETRON 2 MG/ML
4 INJECTION INTRAMUSCULAR; INTRAVENOUS
Status: DISCONTINUED | OUTPATIENT
Start: 2023-04-07 | End: 2023-04-07 | Stop reason: HOSPADM

## 2023-04-07 RX ORDER — FENTANYL CITRATE 50 UG/ML
25 INJECTION, SOLUTION INTRAMUSCULAR; INTRAVENOUS
Status: DISCONTINUED | OUTPATIENT
Start: 2023-04-07 | End: 2023-04-07 | Stop reason: HOSPADM

## 2023-04-07 RX ORDER — FLUMAZENIL 0.1 MG/ML
0.2 INJECTION, SOLUTION INTRAVENOUS
Status: DISCONTINUED | OUTPATIENT
Start: 2023-04-07 | End: 2023-04-07 | Stop reason: HOSPADM

## 2023-04-07 RX ORDER — GLYCOPYRROLATE 0.2 MG/ML
INJECTION, SOLUTION INTRAMUSCULAR; INTRAVENOUS PRN
Status: DISCONTINUED | OUTPATIENT
Start: 2023-04-07 | End: 2023-04-07

## 2023-04-07 RX ORDER — OXYCODONE HYDROCHLORIDE 5 MG/1
10 TABLET ORAL
Status: DISCONTINUED | OUTPATIENT
Start: 2023-04-07 | End: 2023-04-07 | Stop reason: HOSPADM

## 2023-04-07 RX ORDER — SODIUM CHLORIDE, SODIUM LACTATE, POTASSIUM CHLORIDE, CALCIUM CHLORIDE 600; 310; 30; 20 MG/100ML; MG/100ML; MG/100ML; MG/100ML
INJECTION, SOLUTION INTRAVENOUS CONTINUOUS
Status: DISCONTINUED | OUTPATIENT
Start: 2023-04-07 | End: 2023-04-07 | Stop reason: HOSPADM

## 2023-04-07 RX ORDER — OXYCODONE HYDROCHLORIDE 5 MG/1
5 TABLET ORAL
Status: DISCONTINUED | OUTPATIENT
Start: 2023-04-07 | End: 2023-04-07 | Stop reason: HOSPADM

## 2023-04-07 RX ORDER — LIDOCAINE 40 MG/G
CREAM TOPICAL
Status: DISCONTINUED | OUTPATIENT
Start: 2023-04-07 | End: 2023-04-07 | Stop reason: HOSPADM

## 2023-04-07 RX ADMIN — LIDOCAINE HYDROCHLORIDE 50 MG: 20 INJECTION, SOLUTION INFILTRATION; PERINEURAL at 10:16

## 2023-04-07 RX ADMIN — GLYCOPYRROLATE 0.2 MG: 0.2 INJECTION, SOLUTION INTRAMUSCULAR; INTRAVENOUS at 10:17

## 2023-04-07 RX ADMIN — TOPICAL ANESTHETIC 1 EACH: 200 SPRAY DENTAL; PERIODONTAL at 10:18

## 2023-04-07 RX ADMIN — PROPOFOL 150 MCG/KG/MIN: 10 INJECTION, EMULSION INTRAVENOUS at 10:16

## 2023-04-07 RX ADMIN — LIDOCAINE HYDROCHLORIDE 0.1 ML: 10 INJECTION, SOLUTION EPIDURAL; INFILTRATION; INTRACAUDAL; PERINEURAL at 09:24

## 2023-04-07 RX ADMIN — TOPICAL ANESTHETIC 1 EACH: 200 SPRAY DENTAL; PERIODONTAL at 10:15

## 2023-04-07 RX ADMIN — SODIUM CHLORIDE, POTASSIUM CHLORIDE, SODIUM LACTATE AND CALCIUM CHLORIDE: 600; 310; 30; 20 INJECTION, SOLUTION INTRAVENOUS at 09:23

## 2023-04-07 ASSESSMENT — ACTIVITIES OF DAILY LIVING (ADL)
ADLS_ACUITY_SCORE: 33
ADLS_ACUITY_SCORE: 35

## 2023-04-07 NOTE — ANESTHESIA CARE TRANSFER NOTE
Patient: John Guzman    Procedure: Procedure(s):  COLONOSCOPY, FLEXIBLE, WITH LESION REMOVAL USING SNARE  Esophagoscopy, gastroscopy, duodenoscopy (EGD), combined       Diagnosis: Special screening for malignant neoplasm of colon [Z12.11]  Traylor's esophagus without dysplasia [K22.70]  Diagnosis Additional Information: No value filed.    Anesthesia Type:   General     Note:    Oropharynx: oropharynx clear of all foreign objects  Level of Consciousness: awake  Oxygen Supplementation: room air    Independent Airway: airway patency satisfactory and stable  Dentition: dentition unchanged  Vital Signs Stable: post-procedure vital signs reviewed and stable  Report to RN Given: handoff report given  Patient transferred to: Phase II    Handoff Report: Identifed the Patient, Identified the Reponsible Provider, Reviewed the pertinent medical history, Discussed the surgical course, Reviewed Intra-OP anesthesia mangement and issues during anesthesia, Set expectations for post-procedure period and Allowed opportunity for questions and acknowledgement of understanding      Vitals:  Vitals Value Taken Time   /71 04/07/23 1058   Temp 36.3  C (97.3  F) 04/07/23 1058   Pulse 60 04/07/23 1058   Resp     SpO2 95 % 04/07/23 1102   Vitals shown include unvalidated device data.    Electronically Signed By: BLAZE Owusu CRNA  April 7, 2023  11:03 AM

## 2023-04-07 NOTE — LETTER
John Guzman  [id number] [facility] [address]      April 14, 2023    Dear John,  This letter is written to inform you of the results of your recent colonoscopy.  Your examination showed polyp(s) in your cecum and rectum. All polyps were removed in their entirety and sent for review by a pathologist. As you will see on the pathology report below, the tissue(s) were tubular adenomatous polyps and hyperplastic polyps. Your examination was otherwise without abnormality.    Final Diagnosis   A.  Stomach, antrum: Biopsy:  - Antral-type mucosa within normal limits  - No Helicobacter pylori-like organisms seen on H&E examination      B.  Stomach, body, polyp: Biopsy:  - Fundic gland polyp, negative for dysplasia      C.  Gastroesophageal junction: Biopsy:  - Squamocolumnar mucosa with chronic inflammation, negative for intestinal metaplasia and dysplasia      D.  Colon, cecum: Polypectomy:  - Tubular adenoma  - No evidence of high-grade dysplasia or invasive malignancy      E.  Rectum: Polypectomy:  - Fragments of hyperplastic polyp (2 fragments)       Adenomatous polyps are entirely benign (non-cancerous); however, patients who have developed these polyps are at an increased risk for developing additional polyps in the future. If these are not eventually removed, there is a risk of developing colon cancer. We will advise more frequent examinations with you because of the risk associated with this type of polyp.    Given these findings, your personal history of polyps, I recommend that you undergo a repeat colonoscopy in 5 year(s) for surveillance. We will enter you into a recall system so you receive a reminder closer to the time that you are due for repeat examination.     Please remember that this recommendation is made with the understanding that you are not experiencing persistent changes in bowel function, bleeding per rectum, and/or significant abdominal pain. If you experience these symptoms, please  contact your primary care provider for a further evaluation.     If you have any questions or concerns about the results of your colonoscopy or the appropriate follow-up, please contact my assistant at [phone]    Sincerely,      Shankar Acosta,    [facility]  ___

## 2023-04-07 NOTE — ANESTHESIA POSTPROCEDURE EVALUATION
Patient: John Guzman    Procedure: Procedure(s):  COLONOSCOPY, FLEXIBLE, WITH LESION REMOVAL USING SNARE  Esophagoscopy, gastroscopy, duodenoscopy (EGD), combined       Anesthesia Type:  General    Note:  Disposition: Outpatient   Postop Pain Control: Uneventful            Sign Out: Well controlled pain   PONV: No   Neuro/Psych: Uneventful            Sign Out: Acceptable/Baseline neuro status   Airway/Respiratory: Uneventful            Sign Out: Acceptable/Baseline resp. status   CV/Hemodynamics: Uneventful            Sign Out: Acceptable CV status; No obvious hypovolemia; No obvious fluid overload   Other NRE: NONE   DID A NON-ROUTINE EVENT OCCUR? No           Last vitals:  Vitals Value Taken Time   /91 04/07/23 1130   Temp 36.3  C (97.3  F) 04/07/23 1058   Pulse 56 04/07/23 1130   Resp     SpO2 95 % 04/07/23 1131   Vitals shown include unvalidated device data.    Electronically Signed By: BLAZE Owusu CRNA  April 7, 2023  11:32 AM

## 2023-04-07 NOTE — H&P
58 year old year old male here for colonoscopy for screening.  Last colonoscopy was 2010--10 year follow-up recommended per patient.  Patient denies blood in stool or change in stool caliber.  There is no known family history of colon cancer or polyps.    He admits chronic reflux.  Has history of Traylor's  Last upper endoscopy was 10 years ago.  Denies dysphagia.  Takes daily PPI.    Patient Active Problem List   Diagnosis     Atypical nevi     Traylor's esophagus     Benign neoplasm of rectum     Cervical disc herniation     Diverticulosis of colon, acquired     Erectile dysfunction     Gastroesophageal reflux disease without esophagitis     Hyperlipidemia     Impaired fasting glucose     Lumbar arthropathy     Moderate obesity     Pronation of feet     Vitamin D deficiency     Prediabetes     Obesity (BMI 30-39.9)     Chronic right shoulder pain     Status post cervical discectomy       Past Medical History:   Diagnosis Date     High cholesterol        Past Surgical History:   Procedure Laterality Date     CERVICAL ARTHOPLASTY/ ARTIFIC DISC, SINGLE       plantar Fachitiis surgery Left        Family History   Problem Relation Age of Onset     Brain Tumor Mother      Other - See Comments Sister         iron lung     No Known Problems Maternal Grandfather      No Known Problems Paternal Grandmother      Alcoholism Sister      No Known Problems Paternal Grandfather        Current Outpatient Rx   Medication Sig Dispense Refill     bisacodyl (DULCOLAX) 5 MG EC tablet Take 2 tablets at 3 pm the day before your procedure. If your procedure is before 11 am, take 2 additional tablets at 11 pm. If your procedure is after 11 am, take 2 additional tablets at 6 am. For additional instructions refer to your colonoscopy prep instructions. 4 tablet 0     polyethylene glycol (GOLYTELY) 236 g suspension The night before the exam at 6 pm drink an 8-ounce glass every 15 minutes until the jug is half empty. If you arrive before 11 AM:  "Drink the other half of the Golytely jug at 11 PM night before procedure. If you arrive after 11 AM: Drink the other half of the Golytely jug at 6 AM day of procedure. For additional instructions refer to your colonoscopy prep instructions. 4000 mL 0       No Known Allergies    Pt reports that he has quit smoking. He has never used smokeless tobacco. He reports that he does not currently use alcohol. He reports that he does not use drugs.    Exam:  /86   Pulse 65   Temp 97.7  F (36.5  C) (Oral)   Resp 16   Ht 1.772 m (5' 9.76\")   Wt 101.5 kg (223 lb 12.8 oz)   SpO2 96%   BMI 32.33 kg/m      Awake, Alert OX3  Lungs - CTA bilaterally  CV - RRR, no murmurs, distal pulses intact  Abd - soft, non-distended, non-tender, +BS  Extr - No cyanosis or edema    A/P 58 year old year old male in need of colonoscopy for screening and upper endoscopy for Traylor's Surveillance. Risks, benefits, alternatives, and complications were discussed including the possibility of perforation, bleeding, missed lesion and the patient agreed to proceed    Shankar Acosta,  on 4/7/2023 at 9:43 AM    "

## 2023-04-10 LAB
PATH REPORT.COMMENTS IMP SPEC: NORMAL
PATH REPORT.COMMENTS IMP SPEC: NORMAL
PATH REPORT.FINAL DX SPEC: NORMAL
PATH REPORT.GROSS SPEC: NORMAL
PATH REPORT.MICROSCOPIC SPEC OTHER STN: NORMAL
PATH REPORT.RELEVANT HX SPEC: NORMAL
PHOTO IMAGE: NORMAL

## 2023-04-10 PROCEDURE — 88305 TISSUE EXAM BY PATHOLOGIST: CPT | Mod: 26 | Performed by: PATHOLOGY

## 2023-05-12 DIAGNOSIS — K22.70 BARRETT'S ESOPHAGUS WITHOUT DYSPLASIA: ICD-10-CM

## 2023-07-19 ENCOUNTER — VIRTUAL VISIT (OUTPATIENT)
Dept: FAMILY MEDICINE | Facility: CLINIC | Age: 58
End: 2023-07-19
Payer: COMMERCIAL

## 2023-07-19 DIAGNOSIS — M54.41 BILATERAL LOW BACK PAIN WITH RIGHT-SIDED SCIATICA, UNSPECIFIED CHRONICITY: Primary | ICD-10-CM

## 2023-07-19 DIAGNOSIS — M54.42 BILATERAL LOW BACK PAIN WITH LEFT-SIDED SCIATICA, UNSPECIFIED CHRONICITY: ICD-10-CM

## 2023-07-19 PROCEDURE — 99213 OFFICE O/P EST LOW 20 MIN: CPT | Mod: VID | Performed by: FAMILY MEDICINE

## 2023-07-19 RX ORDER — NAPROXEN 500 MG/1
500 TABLET ORAL 2 TIMES DAILY PRN
Qty: 30 TABLET | Refills: 0 | Status: SHIPPED | OUTPATIENT
Start: 2023-07-19 | End: 2023-11-22

## 2023-07-19 ASSESSMENT — ENCOUNTER SYMPTOMS: BACK PAIN: 1

## 2023-07-19 NOTE — PROGRESS NOTES
Rigoberto is a 58 year old who is being evaluated via a billable video visit.      How would you like to obtain your AVS? MyChart  If the video visit is dropped, the invitation should be resent by: Send to e-mail at: [email or web address]  Will anyone else be joining your video visit? No        Assessment & Plan     Bilateral low back pain with right-sided sciatica, unspecified chronicity  Bilateral low back pain with left-sided sciatica, unspecified chronicity  Tweaked back by bending over. No trauma. No fevers. No leg weakness, no bowel or bladder dysfunction. Occasional radicular symptoms, nothing persistent. No prior back surgeries.   -- Discussed conservative cares. Utilize naproxen, and tizanidine as needed. Follow up with in person visit if symptoms persist or worsen.   - naproxen (NAPROSYN) 500 MG tablet  Dispense: 30 tablet; Refill: 0  - tiZANidine (ZANAFLEX) 4 MG tablet  Dispense: 40 tablet; Refill: 0    The risks, benefits and treatment options of prescribed medications or other treatments have been discussed with the patient. The patient verbalized their understanding and should call or follow up if no improvement or if they develop further problems.      Neno Sargent, [facility]    Subjective   Rigoberto is a 58 year old, presenting for the following health issues:  Back Pain          7/19/2023     3:56 PM   Additional Questions   Roomed by Christine ARANDA MA   Accompanied by Self     Back Pain     History of Present Illness       Back Pain:  He presents for follow up of back pain. Patient's back pain is a recurring problem.  Location of back pain:  Right lower back and left lower back  Description of back pain: sharp, shooting and stabbing  Back pain spreads: right shoulder and left shoulder    Since patient first noticed back pain, pain is: always present, but gets better and worse  Does back pain interfere with his job:  Not applicable       He eats 4 or more servings of fruits and  vegetables daily.He consumes 0 sweetened beverage(s) daily.He exercises with enough effort to increase his heart rate 20 to 29 minutes per day.  He exercises with enough effort to increase his heart rate 4 days per week.   He is taking medications regularly.     Having issues with his back for the past 30 years.   Will get muscle spasms    Reached down last weekend and felt like he tweaked his back.   No trauma or injury to the back.   Lower back on the belt line.   Pain in the lower back region.   Will get shooting pains down the legs at time. Nothing persistent.   No leg weakness   No loss of bowel or bladder dysfunction.   No fevers.   No prior surgeries on the back.   Has been using advil, stretches in the morning.   Has received injections in the back in the past.       Review of Systems   Musculoskeletal:  Positive for back pain.            Objective           Vitals:  No vitals were obtained today due to virtual visit.    Physical Exam   No exam virtual visit.             Video-Visit Details    Type of service:  Video Visit     Originating Location (pt. Location): Home    Distant Location (provider location):  On-site  Platform used for Video Visit: [employer]

## 2023-08-16 DIAGNOSIS — K22.70 BARRETT'S ESOPHAGUS WITHOUT DYSPLASIA: ICD-10-CM

## 2023-08-16 NOTE — TELEPHONE ENCOUNTER
"Prescription approved per [facility] Refill Protocol.    Signed Prescriptions:                        Disp   Refills    omeprazole (PRILOSEC) 20 MG DR capsule     90 cap*1        Sig: TAKE 1 CAPSULE BY MOUTH EVERY DAY  Authorizing Provider: LAURENT REED IPAPO  Ordering User: KATYA MUÑOZ      Requested Prescriptions   Signed Prescriptions Disp Refills    omeprazole (PRILOSEC) 20 MG DR capsule 90 capsule 1     Sig: TAKE 1 CAPSULE BY MOUTH EVERY DAY       PPI Protocol Passed - 8/16/2023 12:41 AM        Passed - Not on Clopidogrel (unless Pantoprazole ordered)        Passed - No diagnosis of osteoporosis on record        Passed - Recent (12 mo) or future (30 days) visit within the authorizing provider's specialty     Patient has had an office visit with the authorizing provider or a provider within the authorizing providers department within the previous 12 mos or has a future within next 30 days. See \"Patient Info\" tab in inbasket, or \"Choose Columns\" in Meds & Orders section of the refill encounter.              Passed - Medication is active on med list        Passed - Patient is age 18 or older           Katya Muñoz RN on 8/16/2023 at 10:35 AM    " PHYSICIAN NEXT STEPS:   Review Only      CHIEF COMPLAINT:   Chief Complaint/Protocol Used: Rash or Redness - Localized   Onset: today         ASSESSMENT:   Â» Did not know what to do True   Â» Onset: today   Â» Appearance Of Rash: pink-red small flat   Â» Location: left lower arm   Â» Number: too numerous   Â» Size: end of pen   Â» Onset: today   Â» Itching: denied   -------------------------------------------------------      DISPOSITION:   Disposition Recommendation: Home Care   Questions that led to disposition:   Â» Mild localized rash (all triage questions negative)   Patient Directed To: [facility] [place]   Patient Intended Action: Go to Urgent Care Center         CALL NOTES:   01/25/2018 at 2:53 PM by Rachael LEE» Conferenced caregiver Alexia who reported that this is a new rash started at school. Mother requesting to have the patient evaluated as the school needs a physician note. Mother declined appointment tomorrow with PA but stated will go to the [facility].      DISPOSITION OVERRIDE/PROVIDER CONSULT:   Disposition Override: See Physician within 24 Hours   Override Source: Patient stress level   Consulted with PCP: No   Consulted with On-Call Physician: No         CALLER CONTACT INFO:   Name: Luzmaria (Mother)   Phone 1: [phone]         ENCOUNTER STARTED:   01/25/18 02:36:58 PM   ENCOUNTER ASSIGNED TO/CLOSED BY:   Rachael Guerrero @ 01/25/18 02:53:42 PM         -------------------------------------------------------      CARE ADVICE given per Rash or Redness - Localized guideline.   REASSURANCE AND EDUCATION:    * New localized rashes are usually due to skin contact with an irritating substance.; AVOID THE CAUSE:    * Try to find the cause.   * Consider irritants like a plant (e.g., poison ivy or evergreens), chemicals (e.g., solvents or insecticides), Fiberglass, a new cosmetic, or new jewelry (called contact dermatitis).   * A pet may be the intermediary  (e.g., with poison ivy or poison oak) or the child may react directly to pet saliva.; CLEANING THE AFFECTED AREA:    * Wash the area once thoroughly with soap to remove any remaining irritants.   * Thereafter, avoid soaps to this area.   * Cleanse the area when needed with warm water.; COLD SOAKS FOR ITCHING:    * Apply ice or soak in cold water for 20 minutes every 3 or 4 hours to reduce itching or pain.; HYDROCORTISONE CREAM FOR ITCHING:    * If the itch is more than mild, apply 1% hydrocortisone cream OTC (Mau: 0.5%) 3 times per day until it feels better. (Exception: suspected ringworm or impetigo.); AVOID SCRATCHING:    * Cut the fingernails short and discourage scratching to prevent a secondary infection from bacteria.; CALL BACK IF     * Rash spreads or becomes worse     * Rash lasts over 1 week         UNDERSTANDS CARE ADVICE: Yes      AGREES WITH CARE ADVICE: Yes      WILL FOLLOW CARE ADVICE: Yes      -------------------------------------------------------

## 2023-08-24 ENCOUNTER — VIRTUAL VISIT (OUTPATIENT)
Dept: UROLOGY | Facility: CLINIC | Age: 58
End: 2023-08-24
Payer: COMMERCIAL

## 2023-08-24 DIAGNOSIS — N52.9 ERECTILE DYSFUNCTION, UNSPECIFIED ERECTILE DYSFUNCTION TYPE: Primary | ICD-10-CM

## 2023-08-24 PROCEDURE — 99204 OFFICE O/P NEW MOD 45 MIN: CPT | Mod: 95 | Performed by: STUDENT IN AN ORGANIZED HEALTH CARE EDUCATION/TRAINING PROGRAM

## 2023-08-24 NOTE — PROGRESS NOTES
Chief Complaint:   Erectile dysfunction          History of Present Illness:   John Guzman is a 58 year old male with a history of prediabetes, HLD, and Traylor's esophagus with complaints of erectile dysfunction for the last several years.     He reports difficulty both achieving and maintaining an erection.     He has tried both tadalafil and sildenafil and experienced bothersome side effects. He did not think the medications were particularly effective.          Past Medical History:     Past Medical History:   Diagnosis Date    High cholesterol             Past Surgical History:     Past Surgical History:   Procedure Laterality Date    CERVICAL ARTHOPLASTY/ ARTIFIC DISC, SINGLE      COLONOSCOPY N/A 4/7/2023    Procedure: COLONOSCOPY, FLEXIBLE, WITH LESION REMOVAL USING SNARE;  Surgeon: Shankar Acosta DO;  Location: [facility]    ESOPHAGOSCOPY, GASTROSCOPY, DUODENOSCOPY (EGD), COMBINED N/A 4/7/2023    Procedure: Esophagoscopy, gastroscopy, duodenoscopy (EGD), BIOPSY;  Surgeon: Shankar Acosta DO;  Location: [facility]    plantar Fachitiis surgery Left             Medications     Current Outpatient Medications   Medication    naproxen (NAPROSYN) 500 MG tablet    omeprazole (PRILOSEC) 20 MG DR capsule    pravastatin (PRAVACHOL) 40 MG tablet    tadalafil (CIALIS) 20 MG tablet    tiZANidine (ZANAFLEX) 4 MG tablet     No current facility-administered medications for this visit.            Allergies:   Cymbalta [duloxetine hcl]         Review of Systems:  From intake questionnaire   Negative 14 system review except as noted on HPI, nurse's note.         Physical Exam:   Patient is a 58 year old male evaluated via video visit.       Labs and Pathology:    I personally reviewed all applicable laboratory data and went over findings with patient  Significant for:    CBC RESULTS:  Recent Labs   Lab Test 10/23/21  1731   WBC 5.9   HGB 14.1           BMP RESULTS:  Recent Labs   Lab Test  03/29/23  0715 10/23/21  1731    141   POTASSIUM 4.2 3.8   CHLORIDE 103 110*   CO2 23 24   ANIONGAP 12 7   * 114*   BUN 11.9 11   CR 1.00 0.97   GFRESTIMATED 88 88   GAYATHRI 9.3 8.3*       PSA RESULTS  Prostate Specific Antigen Screen   Date Value Ref Range Status   03/29/2023 2.86 0.00 - 3.50 ng/mL Final            Assessment and Plan:     Assessment: 58 year old male with a several year history of erectile dysfunction. He has tried both tadalafil and sildenafil and experienced bothersome side effects. He did not think the medications were particularly effective.     We discussed treatment of erectile dysfunction with oral PDE-5 inhibitors, a vacuum erection device, intra cavernosal injections, and penile prosthesis surgery. The patient elected to pursue treatment with alprostadil injections. Side effects and proper usage were reviewed.     Plan:  Alprostadil 50 mcg/mL, inject 0.2-0.4 mL via intracavernous route as needed for sexual activity, no more than three times per week.     BILLY JOHN PA-C  Department of Urology

## 2023-08-24 NOTE — PROGRESS NOTES
John Guzman is a 58 year old year old who is being evaluated via a billable video visit.      How would you like to obtain your AVS? MyChart  If the video visit is dropped, the invitation should be resent by: Text to cell phone: [phone]  Will anyone else be joining your video visit? Wife    Video-Visit Details    Type of service:  Video Visit   Video Start Time: 8:07 AM  Video End Time:8:22 AM    Originating Location (pt. Location): Home    Distant Location (provider location):  On-site  Platform used for Video Visit: [employer]

## 2023-09-12 ENCOUNTER — OFFICE VISIT (OUTPATIENT)
Dept: FAMILY MEDICINE | Facility: CLINIC | Age: 58
End: 2023-09-12
Payer: COMMERCIAL

## 2023-09-12 VITALS
TEMPERATURE: 97 F | OXYGEN SATURATION: 99 % | SYSTOLIC BLOOD PRESSURE: 106 MMHG | DIASTOLIC BLOOD PRESSURE: 72 MMHG | WEIGHT: 181.2 LBS | RESPIRATION RATE: 16 BRPM | BODY MASS INDEX: 25.94 KG/M2 | HEART RATE: 66 BPM | HEIGHT: 70 IN

## 2023-09-12 DIAGNOSIS — E78.2 MIXED HYPERLIPIDEMIA: Primary | ICD-10-CM

## 2023-09-12 DIAGNOSIS — Z12.5 SCREENING FOR PROSTATE CANCER: ICD-10-CM

## 2023-09-12 DIAGNOSIS — R63.4 WEIGHT LOSS: ICD-10-CM

## 2023-09-12 LAB
ALBUMIN SERPL BCG-MCNC: 4.9 G/DL (ref 3.5–5.2)
ALP SERPL-CCNC: 76 U/L (ref 40–129)
ALT SERPL W P-5'-P-CCNC: 21 U/L (ref 0–70)
ANION GAP SERPL CALCULATED.3IONS-SCNC: 9 MMOL/L (ref 7–15)
AST SERPL W P-5'-P-CCNC: 18 U/L (ref 0–45)
BASOPHILS # BLD AUTO: 0 10E3/UL (ref 0–0.2)
BASOPHILS NFR BLD AUTO: 1 %
BILIRUB SERPL-MCNC: 0.4 MG/DL
BUN SERPL-MCNC: 12.7 MG/DL (ref 6–20)
CALCIUM SERPL-MCNC: 10 MG/DL (ref 8.6–10)
CHLORIDE SERPL-SCNC: 102 MMOL/L (ref 98–107)
CHOLEST SERPL-MCNC: 219 MG/DL
CREAT SERPL-MCNC: 0.96 MG/DL (ref 0.67–1.17)
DEPRECATED CALCIDIOL+CALCIFEROL SERPL-MC: 49 UG/L (ref 20–75)
DEPRECATED HCO3 PLAS-SCNC: 28 MMOL/L (ref 22–29)
EGFRCR SERPLBLD CKD-EPI 2021: >90 ML/MIN/1.73M2
EOSINOPHIL # BLD AUTO: 0.1 10E3/UL (ref 0–0.7)
EOSINOPHIL NFR BLD AUTO: 2 %
ERYTHROCYTE [DISTWIDTH] IN BLOOD BY AUTOMATED COUNT: 12.6 % (ref 10–15)
GLUCOSE SERPL-MCNC: 107 MG/DL (ref 70–99)
HCT VFR BLD AUTO: 44.2 % (ref 40–53)
HDLC SERPL-MCNC: 54 MG/DL
HGB BLD-MCNC: 14.9 G/DL (ref 13.3–17.7)
IMM GRANULOCYTES # BLD: 0 10E3/UL
IMM GRANULOCYTES NFR BLD: 0 %
LDLC SERPL CALC-MCNC: 147 MG/DL
LYMPHOCYTES # BLD AUTO: 1.8 10E3/UL (ref 0.8–5.3)
LYMPHOCYTES NFR BLD AUTO: 31 %
MCH RBC QN AUTO: 29.2 PG (ref 26.5–33)
MCHC RBC AUTO-ENTMCNC: 33.7 G/DL (ref 31.5–36.5)
MCV RBC AUTO: 87 FL (ref 78–100)
MONOCYTES # BLD AUTO: 0.5 10E3/UL (ref 0–1.3)
MONOCYTES NFR BLD AUTO: 9 %
NEUTROPHILS # BLD AUTO: 3.3 10E3/UL (ref 1.6–8.3)
NEUTROPHILS NFR BLD AUTO: 58 %
NONHDLC SERPL-MCNC: 165 MG/DL
PLATELET # BLD AUTO: 195 10E3/UL (ref 150–450)
POTASSIUM SERPL-SCNC: 4.5 MMOL/L (ref 3.4–5.3)
PROT SERPL-MCNC: 7.4 G/DL (ref 6.4–8.3)
PSA SERPL DL<=0.01 NG/ML-MCNC: 1.59 NG/ML (ref 0–3.5)
RBC # BLD AUTO: 5.1 10E6/UL (ref 4.4–5.9)
SODIUM SERPL-SCNC: 139 MMOL/L (ref 136–145)
TRIGL SERPL-MCNC: 91 MG/DL
TSH SERPL DL<=0.005 MIU/L-ACNC: 2.63 UIU/ML (ref 0.3–4.2)
VIT B12 SERPL-MCNC: 758 PG/ML (ref 232–1245)
WBC # BLD AUTO: 5.8 10E3/UL (ref 4–11)

## 2023-09-12 PROCEDURE — 99214 OFFICE O/P EST MOD 30 MIN: CPT | Performed by: FAMILY MEDICINE

## 2023-09-12 PROCEDURE — 84443 ASSAY THYROID STIM HORMONE: CPT | Performed by: FAMILY MEDICINE

## 2023-09-12 PROCEDURE — G0103 PSA SCREENING: HCPCS | Performed by: FAMILY MEDICINE

## 2023-09-12 PROCEDURE — 36415 COLL VENOUS BLD VENIPUNCTURE: CPT | Performed by: FAMILY MEDICINE

## 2023-09-12 PROCEDURE — 80061 LIPID PANEL: CPT | Performed by: FAMILY MEDICINE

## 2023-09-12 PROCEDURE — 85025 COMPLETE CBC W/AUTO DIFF WBC: CPT | Performed by: FAMILY MEDICINE

## 2023-09-12 PROCEDURE — 82306 VITAMIN D 25 HYDROXY: CPT | Performed by: FAMILY MEDICINE

## 2023-09-12 PROCEDURE — 82607 VITAMIN B-12: CPT | Performed by: FAMILY MEDICINE

## 2023-09-12 PROCEDURE — 80053 COMPREHEN METABOLIC PANEL: CPT | Performed by: FAMILY MEDICINE

## 2023-09-12 RX ORDER — CHLORAL HYDRATE 500 MG
1000 CAPSULE ORAL
COMMUNITY
End: 2023-11-22

## 2023-09-12 RX ORDER — CHOLECALCIFEROL (VITAMIN D3) 10 MCG (400 UNIT) TABLET
COMMUNITY
End: 2023-11-22

## 2023-09-12 ASSESSMENT — ENCOUNTER SYMPTOMS
NAUSEA: 0
EYE PAIN: 0
HEMATURIA: 0
DYSURIA: 0
FREQUENCY: 0
SORE THROAT: 0
WEAKNESS: 0
NERVOUS/ANXIOUS: 0
PALPITATIONS: 0
ARTHRALGIAS: 0
FEVER: 0
MYALGIAS: 0
ABDOMINAL PAIN: 0
COUGH: 0
SHORTNESS OF BREATH: 0
HEMATOCHEZIA: 0
CHILLS: 0
HEARTBURN: 0
DIZZINESS: 0
DIARRHEA: 0
PARESTHESIAS: 0
CONSTIPATION: 0
JOINT SWELLING: 0
HEADACHES: 0

## 2023-09-12 ASSESSMENT — PAIN SCALES - GENERAL: PAINLEVEL: NO PAIN (0)

## 2023-09-12 NOTE — PATIENT INSTRUCTIONS
Shorten the fasting period you have been doing.  Start eating small heawlthy breakfast everyday.    Be consistent with low trans fat and saturated fat diet.  Eat food rich in omega-3-fatty acids as you tolerate. (salmon, olive oil)  Eat 5 cups of vegetables, fruits and whole grains per day.  Limit starchy food (white rice, white bread, white pasta, white potatoes) to less than a cup per meal.  Minimize sweets, junk food and fastfood. Limit soda beverages to one serving per day; best to avoid it altogether though.  Exercise: moderate intensity sustained for at least 30 mins per episode, goal of 150 mins per week at least  Combine cardiovascular and resistance exercises.  These exercise recommendations are in addition to your daily activity at work or home.    You will be contacted in 1-2 days for results of your lab tests.   Further recommendations will be given then.    Update your immunizations this fall: flu shot, covid-19 booster. Review your MyChart reminders for any preventive measures due for you.

## 2023-09-12 NOTE — PROGRESS NOTES
Assessment & Plan     Mixed hyperlipidemia  Will recheck levels this time and decide if need to be put back on statin. If needed, a different statin may be tried.  Reinforced heart healthy lifestyle.  - Comprehensive metabolic panel  - Lipid panel reflex to direct LDL Fasting  - Comprehensive metabolic panel  - Lipid panel reflex to direct LDL Fasting    Weight loss  Per patient, intentional.  However, will screen for any occult condition that can cause weight loss.  Screen for vitamin deficiency as well due to change in diet and replace as appropriate.  Discussed healthy eating habits.  See provider again if with unintentional weight loss.  - Comprehensive metabolic panel  - CBC with Platelets & Differential  - TSH with free T4 reflex  - Vitamin B12  - Vitamin D Deficiency  - Prostate Specific Antigen Screen  - Comprehensive metabolic panel  - CBC with Platelets & Differential  - TSH with free T4 reflex  - Vitamin B12  - Vitamin D Deficiency  - Prostate Specific Antigen Screen    Screening for prostate cancer  - Prostate Specific Antigen Screen  - Prostate Specific Antigen Screen      Patient Instructions   Shorten the fasting period you have been doing.  Start eating small heawlthy breakfast everyday.    Be consistent with low trans fat and saturated fat diet.  Eat food rich in omega-3-fatty acids as you tolerate. (salmon, olive oil)  Eat 5 cups of vegetables, fruits and whole grains per day.  Limit starchy food (white rice, white bread, white pasta, white potatoes) to less than a cup per meal.  Minimize sweets, junk food and fastfood. Limit soda beverages to one serving per day; best to avoid it altogether though.  Exercise: moderate intensity sustained for at least 30 mins per episode, goal of 150 mins per week at least  Combine cardiovascular and resistance exercises.  These exercise recommendations are in addition to your daily activity at work or home.    You will be contacted in 1-2 days for results of  your lab tests.   Further recommendations will be given then.    Update your immunizations this fall: flu shot, covid-19 booster. Review your MyChart reminders for any preventive measures due for you.    Gideon Abebe MD  [facility]    Seda Franklin is a 58 year old, presenting for the following health issues:  Lipids and Blood Draw        9/12/2023     8:46 AM   Additional Questions   Roomed by Yazmin   Accompanied by Self       Healthy Habits:     Getting at least 3 servings of Calcium per day:  Yes    Bi-annual eye exam:  NO    Dental care twice a year:  Yes    Sleep apnea or symptoms of sleep apnea:  None    Diet:  Regular (no restrictions)    Frequency of exercise:  4-5 days/week    Duration of exercise:  30-45 minutes    Taking medications regularly:  Yes    Medication side effects:  None    Additional concerns today:  Yes     Patient stopped statin 90 days ago due to muscle achjes. Did not see provider since.  Patient states he has drastically changed his diet.  Fasts in the morning - skips breakfast.  Has decreased significantly his caloric intake.  States he has lost weight doing the above.  Now in maintenance mode - reintroducing carbs and good fats.    Hyperlipidemia Follow-Up    Are you regularly taking any medication or supplement to lower your cholesterol?   No  Are you having muscle aches or other side effects that you think could be caused by your cholesterol lowering medication?  No  How many servings of fruits and vegetables do you eat daily?  4 or more  On average, how many sweetened beverages do you drink each day (Examples: soda, juice, sweet tea, etc.  Do NOT count diet or artificially sweetened beverages)?   0  How many days per week do you exercise enough to make your heart beat faster? 7  How many minutes a day do you exercise enough to make your heart beat faster? 30 - 60  How many days per week do you miss taking your medication? 0  Patient would also like  "glucose checked    Reports he has had GERD for many years and had been on chronic omeprazole. No previous evaluation.  Since the change in diet above, patient has not needed the PPI.    Review of Systems   Constitutional:  Negative for chills and fever.   HENT:  Positive for hearing loss. Negative for congestion, ear pain and sore throat.    Eyes:  Negative for pain and visual disturbance.   Respiratory:  Negative for cough and shortness of breath.    Cardiovascular:  Negative for chest pain, palpitations and peripheral edema.   Gastrointestinal:  Negative for abdominal pain, constipation, diarrhea, heartburn, hematochezia and nausea.   Genitourinary:  Negative for dysuria, frequency, genital sores, hematuria and urgency.   Musculoskeletal:  Negative for arthralgias, joint swelling and myalgias.   Skin:  Negative for rash.   Neurological:  Negative for dizziness, weakness, headaches and paresthesias.   Psychiatric/Behavioral:  Negative for mood changes. The patient is not nervous/anxious.           Objective    /72 (BP Location: Left arm, Patient Position: Chair, Cuff Size: Adult Regular)   Pulse 66   Temp 97  F (36.1  C) (Tympanic)   Resp 16   Ht 1.772 m (5' 9.75\")   Wt 82.2 kg (181 lb 3.2 oz)   SpO2 99%   BMI 26.19 kg/m    Body mass index is 26.19 kg/m .  Physical Exam   GENERAL:  alert and no distress, ambulatory w/o assist  NECK: no tenderness, no adenopathy,  Thyroid not enlarged  RESP: lungs clear to auscultation - no rales, no rhonchi, no wheezes  CV: regular rates and rhythm, no murmur  MS: no edema  SKIN: no suspicious lesions, no rashes  NEURO: strength and tone- normal, sensory exam- grossly normal, mentation- intact, speech- normal, reflexes- symmetric  ABD:  nontender     No results found for any visits on 09/12/23.                  "

## 2023-09-13 ENCOUNTER — MYC MEDICAL ADVICE (OUTPATIENT)
Dept: FAMILY MEDICINE | Facility: CLINIC | Age: 58
End: 2023-09-13
Payer: COMMERCIAL

## 2023-09-13 NOTE — TELEPHONE ENCOUNTER
The 10-year ASCVD risk score (Pawel VAZQUEZ, et al., 2019) is: 5%    Values used to calculate the score:      Age: 58 years      Sex: Male      Is Non- : No      Diabetic: No      Tobacco smoker: No      Systolic Blood Pressure: 106 mmHg      Is BP treated: No      HDL Cholesterol: 54 mg/dL      Total Cholesterol: 219 mg/dL

## 2023-11-22 ENCOUNTER — OFFICE VISIT (OUTPATIENT)
Dept: FAMILY MEDICINE | Facility: CLINIC | Age: 58
End: 2023-11-22
Payer: COMMERCIAL

## 2023-11-22 VITALS
WEIGHT: 178.1 LBS | DIASTOLIC BLOOD PRESSURE: 77 MMHG | SYSTOLIC BLOOD PRESSURE: 119 MMHG | BODY MASS INDEX: 26.38 KG/M2 | HEIGHT: 69 IN | RESPIRATION RATE: 16 BRPM | HEART RATE: 63 BPM | TEMPERATURE: 97.7 F | OXYGEN SATURATION: 98 %

## 2023-11-22 DIAGNOSIS — R04.0 RECURRENT EPISTAXIS: Primary | ICD-10-CM

## 2023-11-22 LAB
BASOPHILS # BLD AUTO: 0 10E3/UL (ref 0–0.2)
BASOPHILS NFR BLD AUTO: 1 %
EOSINOPHIL # BLD AUTO: 0.1 10E3/UL (ref 0–0.7)
EOSINOPHIL NFR BLD AUTO: 2 %
ERYTHROCYTE [DISTWIDTH] IN BLOOD BY AUTOMATED COUNT: 12.4 % (ref 10–15)
HCT VFR BLD AUTO: 44.4 % (ref 40–53)
HGB BLD-MCNC: 14.7 G/DL (ref 13.3–17.7)
IMM GRANULOCYTES # BLD: 0 10E3/UL
IMM GRANULOCYTES NFR BLD: 0 %
INR BLD: 1 (ref 0.9–1.1)
LYMPHOCYTES # BLD AUTO: 1.7 10E3/UL (ref 0.8–5.3)
LYMPHOCYTES NFR BLD AUTO: 29 %
MCH RBC QN AUTO: 29.5 PG (ref 26.5–33)
MCHC RBC AUTO-ENTMCNC: 33.1 G/DL (ref 31.5–36.5)
MCV RBC AUTO: 89 FL (ref 78–100)
MONOCYTES # BLD AUTO: 0.6 10E3/UL (ref 0–1.3)
MONOCYTES NFR BLD AUTO: 10 %
NEUTROPHILS # BLD AUTO: 3.4 10E3/UL (ref 1.6–8.3)
NEUTROPHILS NFR BLD AUTO: 59 %
PLATELET # BLD AUTO: 187 10E3/UL (ref 150–450)
RBC # BLD AUTO: 4.99 10E6/UL (ref 4.4–5.9)
WBC # BLD AUTO: 5.9 10E3/UL (ref 4–11)

## 2023-11-22 PROCEDURE — 85610 PROTHROMBIN TIME: CPT | Performed by: PHYSICIAN ASSISTANT

## 2023-11-22 PROCEDURE — 85025 COMPLETE CBC W/AUTO DIFF WBC: CPT | Performed by: PHYSICIAN ASSISTANT

## 2023-11-22 PROCEDURE — 99213 OFFICE O/P EST LOW 20 MIN: CPT | Performed by: PHYSICIAN ASSISTANT

## 2023-11-22 PROCEDURE — 36415 COLL VENOUS BLD VENIPUNCTURE: CPT | Performed by: PHYSICIAN ASSISTANT

## 2023-11-22 ASSESSMENT — PAIN SCALES - GENERAL: PAINLEVEL: NO PAIN (0)

## 2023-11-22 NOTE — PROGRESS NOTES
"  Assessment & Plan     (R04.0) Recurrent epistaxis  (primary encounter diagnosis)  Comment: Recurrent epistaxis.  This been ongoing over the last year and a half.  There is edema to the nasal septum on the left.  Discussed with patient symptomatic care including thyroid screening, has a lesion to the site and avoiding any aggressive manipulation of that tissue.  Patient denies any nasal sprays substances.  Discussed following up with ENT if continuing to have ongoing bleeding at this patient was in agreement with this.  Will check CBC as well as INR given the longevity of symptoms as well as potential for bleeding risk.  Plan: CBC with platelets and differential, INR point         of care, Adult ENT  Referral           BMI:   Estimated body mass index is 26.49 kg/m  as calculated from the following:    Height as of this encounter: 1.746 m (5' 8.75\").    Weight as of this encounter: 80.8 kg (178 lb 1.6 oz).   SUDHAKAR Garg [facility] JOHN Franklin is a 58 year old, presenting for the following health issues:  Epistaxis (Epistaxis at least once a week. )    History of Present Illness       Reason for visit:  Bloody noses  Symptom onset:  More than a month  Symptoms include:  Spontaneous nose bleeds  Symptom intensity:  Moderate  Symptom progression:  Staying the same  Had these symptoms before:  Yes  Has tried/received treatment for these symptoms:  No  What makes it worse:  Heat like a sauna or hot shower  What makes it better:  No    He eats 2-3 servings of fruits and vegetables daily.He consumes 0 sweetened beverage(s) daily.He exercises with enough effort to increase his heart rate 10 to 19 minutes per day.  He exercises with enough effort to increase his heart rate 4 days per week.   He is taking medications regularly.    Over the year and a half ago patient started having more frequent nosebleeds.  This has been out of the left nostril.  Occurs 2 times per week " "now.  These last for 3 minutes and resolved with direct pressure.  Denies any history of bleeding issues.  No trauma or injury to the nose.  Occasionally heat related to a hot shower will cause symptoms.  No other bleeding issues.  Denies any nasal sprays or snorting any substances.     Review of Systems   HENT:  Positive for nosebleeds.           Objective    /77   Pulse 63   Temp 97.7  F (36.5  C) (Tympanic)   Resp 16   Ht 1.746 m (5' 8.75\")   Wt 80.8 kg (178 lb 1.6 oz)   SpO2 98%   BMI 26.49 kg/m    Body mass index is 26.49 kg/m .  Physical Exam   GENERAL: healthy, alert and no distress  EYES: Eyes grossly normal to inspection  HENT: normal cephalic/atraumatic, nasal mucosa edematous along the septum of the left nostril, oropharynx clear, and oral mucous membranes moist  RESP: lungs clear to auscultation - no rales, rhonchi or wheezes  CV: regular rate and rhythm, normal S1 S2, no S3 or S4, no murmur, click or rub,    MS: no gross musculoskeletal defects noted, no edema                      "

## 2024-04-20 ENCOUNTER — HEALTH MAINTENANCE LETTER (OUTPATIENT)
Age: 59
End: 2024-04-20

## 2024-06-07 NOTE — ANESTHESIA PREPROCEDURE EVALUATION
Anesthesia Pre-Procedure Evaluation    Patient: John Guzman   MRN: [id number] : 1965        Procedure : Procedure(s):  Colonoscopy  Esophagoscopy, gastroscopy, duodenoscopy (EGD), combined          Past Medical History:   Diagnosis Date     High cholesterol       Past Surgical History:   Procedure Laterality Date     CERVICAL ARTHOPLASTY/ ARTIFIC DISC, SINGLE       plantar Fachitiis surgery Left       No Known Allergies   Social History     Tobacco Use     Smoking status: Former     Smokeless tobacco: Never   Vaping Use     Vaping status: Never Used   Substance Use Topics     Alcohol use: Not Currently      Wt Readings from Last 1 Encounters:   03/15/23 101.5 kg (223 lb 12.8 oz)        Anesthesia Evaluation   Pt has had prior anesthetic. Type: General.        ROS/MED HX  ENT/Pulmonary:     (+) tobacco use, Past use,     Neurologic:       Cardiovascular:     (+) Dyslipidemia -----    METS/Exercise Tolerance:     Hematologic:       Musculoskeletal:       GI/Hepatic:     (+) GERD, esophageal disease,     Renal/Genitourinary:       Endo:     (+) type II DM, Obesity,     Psychiatric/Substance Use:       Infectious Disease:       Malignancy:       Other:            Physical Exam    Airway  airway exam normal      Mallampati: I   TM distance: > 3 FB   Neck ROM: full   Mouth opening: > 3 cm    Respiratory Devices and Support         Dental       (+) Minor Abnormalities - some fillings, tiny chips      Cardiovascular          Rhythm and rate: regular and normal     Pulmonary           breath sounds clear to auscultation           OUTSIDE LABS:  CBC:   Lab Results   Component Value Date    WBC 5.9 10/23/2021    HGB 14.1 10/23/2021    HCT 41.6 10/23/2021     10/23/2021     BMP:   Lab Results   Component Value Date     2023     10/23/2021    POTASSIUM 4.2 2023    POTASSIUM 3.8 10/23/2021    CHLORIDE 103 2023    CHLORIDE 110 (H) 10/23/2021    CO2 23 2023    CO2 24 10/23/2021  Pulm Referral -     Called family to schedule appointment - unable to leave voicemail as voicemail is full.       BUN 11.9 03/29/2023    BUN 11 10/23/2021    CR 1.00 03/29/2023    CR 0.97 10/23/2021     (H) 03/29/2023     (H) 10/23/2021     COAGS: No results found for: PTT, INR, FIBR  POC: No results found for: BGM, HCG, HCGS  HEPATIC:   Lab Results   Component Value Date    ALT 34 03/29/2023     OTHER:   Lab Results   Component Value Date    A1C 5.8 (H) 03/29/2023    GAYATHRI 9.3 03/29/2023    TSH 2.50 07/21/2021       Anesthesia Plan    ASA Status:  3   NPO Status:  NPO Appropriate    Anesthesia Type: General.     - Airway: Native airway   Induction: Propofol, Intravenous.   Maintenance: TIVA.        Consents    Anesthesia Plan(s) and associated risks, benefits, and realistic alternatives discussed. Questions answered and patient/representative(s) expressed understanding.     - Discussed: Risks, Benefits and Alternatives for BOTH SEDATION and the PROCEDURE were discussed     - Discussed with:  Patient      - Extended Intubation/Ventilatory Support Discussed: No.      - Patient is DNR/DNI Status: No    Use of blood products discussed: No .     Postoperative Care            Comments:                Terry Walters CRNA, APRN MARTA

## 2024-07-24 ENCOUNTER — OFFICE VISIT (OUTPATIENT)
Dept: FAMILY MEDICINE | Facility: CLINIC | Age: 59
End: 2024-07-24
Payer: COMMERCIAL

## 2024-07-24 VITALS
TEMPERATURE: 97.7 F | SYSTOLIC BLOOD PRESSURE: 118 MMHG | BODY MASS INDEX: 25.45 KG/M2 | RESPIRATION RATE: 16 BRPM | OXYGEN SATURATION: 98 % | HEIGHT: 70 IN | WEIGHT: 177.8 LBS | DIASTOLIC BLOOD PRESSURE: 76 MMHG | HEART RATE: 61 BPM

## 2024-07-24 DIAGNOSIS — M79.5 FOREIGN BODY (FB) IN SOFT TISSUE: Primary | ICD-10-CM

## 2024-07-24 PROCEDURE — 99214 OFFICE O/P EST MOD 30 MIN: CPT | Performed by: STUDENT IN AN ORGANIZED HEALTH CARE EDUCATION/TRAINING PROGRAM

## 2024-07-24 RX ORDER — CEPHALEXIN 500 MG/1
500 CAPSULE ORAL 2 TIMES DAILY
Qty: 14 CAPSULE | Refills: 0 | Status: SHIPPED | OUTPATIENT
Start: 2024-07-24 | End: 2024-07-31

## 2024-07-24 ASSESSMENT — ANXIETY QUESTIONNAIRES
6. BECOMING EASILY ANNOYED OR IRRITABLE: NOT AT ALL
5. BEING SO RESTLESS THAT IT IS HARD TO SIT STILL: NOT AT ALL
2. NOT BEING ABLE TO STOP OR CONTROL WORRYING: NOT AT ALL
4. TROUBLE RELAXING: NOT AT ALL
IF YOU CHECKED OFF ANY PROBLEMS ON THIS QUESTIONNAIRE, HOW DIFFICULT HAVE THESE PROBLEMS MADE IT FOR YOU TO DO YOUR WORK, TAKE CARE OF THINGS AT HOME, OR GET ALONG WITH OTHER PEOPLE: NOT DIFFICULT AT ALL
8. IF YOU CHECKED OFF ANY PROBLEMS, HOW DIFFICULT HAVE THESE MADE IT FOR YOU TO DO YOUR WORK, TAKE CARE OF THINGS AT HOME, OR GET ALONG WITH OTHER PEOPLE?: NOT DIFFICULT AT ALL
7. FEELING AFRAID AS IF SOMETHING AWFUL MIGHT HAPPEN: NOT AT ALL
GAD7 TOTAL SCORE: 0
3. WORRYING TOO MUCH ABOUT DIFFERENT THINGS: NOT AT ALL
1. FEELING NERVOUS, ANXIOUS, OR ON EDGE: NOT AT ALL
GAD7 TOTAL SCORE: 0
7. FEELING AFRAID AS IF SOMETHING AWFUL MIGHT HAPPEN: NOT AT ALL

## 2024-07-24 ASSESSMENT — PAIN SCALES - GENERAL: PAINLEVEL: NO PAIN (1)

## 2024-07-24 NOTE — PROGRESS NOTES
"  Assessment & Plan     Foreign body (FB) in soft tissue  > patient admits he is not sure if there was actually a splinter in his hand because when he tried to manually remove it at home he couldn't find anything, it's unclear if there is a foreign body in his hand or not, x-ray will not be helpful given that the foreign object would be wood, could consider an ultrasound but at this time will trial antibiotics first to see if that resolves his symptoms and if not, will order ultrasound and refer to ortho for additional recommendations   - cephALEXin (KEFLEX) 500 MG capsule; Take 1 capsule (500 mg) by mouth 2 times daily for 7 days      Follow-up plan:   - call patient in 2 days to see how he is responding to antibiotics treatment     BMI  Estimated body mass index is 25.45 kg/m  as calculated from the following:    Height as of this encounter: 1.78 m (5' 10.08\").    Weight as of this encounter: 80.6 kg (177 lb 12.8 oz).       Seda Franklin is a 59 year old, presenting for the following health issues:  Foreign Body in Skin (In left hand x 6 days - has been unable to remove it himself )        7/24/2024     3:17 PM   Additional Questions   Roomed by Halina TAMEZ     History of Present Illness       Reason for visit:  Sliver    He eats 2-3 servings of fruits and vegetables daily.He consumes 0 sweetened beverage(s) daily.He exercises with enough effort to increase his heart rate 30 to 60 minutes per day.  He exercises with enough effort to increase his heart rate 4 days per week.   He is taking medications regularly.         Skin Lesion - Foreign Body in Skin (In left hand x 6 days - has been unable to remove it himself )  Onset/Duration: x 6 days   Description  Location: left palm   Color: brown and pink  Border description: raised, scaly, inflamed  Character: round  Itching: mild  Bleeding:  No  Intensity:  severe  Progression of Symptoms:  worsening  Precipitating or alleviating factors: none   Therapies tried and " "outcome: none  Denies any active indication of infection including no discharge, pus, bleeding, etc.     ROS:   As above         Objective    /76 (BP Location: Right arm, Patient Position: Sitting, Cuff Size: Adult Regular)   Pulse 61   Temp 97.7  F (36.5  C) (Tympanic)   Resp 16   Ht 1.78 m (5' 10.08\")   Wt 80.6 kg (177 lb 12.8 oz)   SpO2 98%   BMI 25.45 kg/m    Body mass index is 25.45 kg/m .  Physical Exam  Constitutional:       General: He is not in acute distress.     Appearance: Normal appearance.   HENT:      Head: Normocephalic.   Eyes:      General: No scleral icterus.        Right eye: No discharge.         Left eye: No discharge.      Extraocular Movements: Extraocular movements intact.      Conjunctiva/sclera: Conjunctivae normal.   Pulmonary:      Effort: Pulmonary effort is normal. No respiratory distress.   Musculoskeletal:         General: Normal range of motion.        Hands:       Cervical back: Normal range of motion.      Comments: Area in red is where the patient reports a potential entry point for a splinter, the area was tender to palpation, but there was no active draining, fluctuance, bleeding, or pustular discharge noted    Skin:     General: Skin is warm.      Findings: No rash.      Comments: No rash on exposed skin   Neurological:      General: No focal deficit present.      Mental Status: He is alert and oriented to person, place, and time.   Psychiatric:         Mood and Affect: Mood normal.         Behavior: Behavior normal.        Site cleansed with betadine. 2cc of 1% lidocaine without epi used to numb affected area. 11 blade scalpel was used to make an incision and attempts were made to find splinter using splinter tweezers. No splinter findings were appreciated. Hemostasis was achieved with pressure, incision site was covered with a bandage and held in place with coban.           Signed Electronically by: GAIL GALLARDO MD    "

## 2024-07-26 ENCOUNTER — TELEPHONE (OUTPATIENT)
Dept: FAMILY MEDICINE | Facility: CLINIC | Age: 59
End: 2024-07-26
Payer: COMMERCIAL

## 2024-07-26 NOTE — CONFIDENTIAL NOTE
Called the patient to follow-up on his hand.  He states that his hand is  but more improved compared to prior.  He is aware to complete his course of antibiotics as they were prescribed to him.  To make a follow-up appointment if his symptoms worsen after antibiotics course is completed.    Sadia Winter MD

## 2024-09-11 ENCOUNTER — PATIENT OUTREACH (OUTPATIENT)
Dept: CARE COORDINATION | Facility: CLINIC | Age: 59
End: 2024-09-11
Payer: COMMERCIAL

## 2024-09-29 SDOH — HEALTH STABILITY: PHYSICAL HEALTH: ON AVERAGE, HOW MANY MINUTES DO YOU ENGAGE IN EXERCISE AT THIS LEVEL?: 30 MIN

## 2024-09-29 SDOH — HEALTH STABILITY: PHYSICAL HEALTH: ON AVERAGE, HOW MANY DAYS PER WEEK DO YOU ENGAGE IN MODERATE TO STRENUOUS EXERCISE (LIKE A BRISK WALK)?: 3 DAYS

## 2024-09-29 ASSESSMENT — SOCIAL DETERMINANTS OF HEALTH (SDOH): HOW OFTEN DO YOU GET TOGETHER WITH FRIENDS OR RELATIVES?: THREE TIMES A WEEK

## 2024-09-30 ENCOUNTER — OFFICE VISIT (OUTPATIENT)
Dept: FAMILY MEDICINE | Facility: CLINIC | Age: 59
End: 2024-09-30
Payer: COMMERCIAL

## 2024-09-30 VITALS
OXYGEN SATURATION: 98 % | HEART RATE: 54 BPM | SYSTOLIC BLOOD PRESSURE: 114 MMHG | TEMPERATURE: 98.4 F | WEIGHT: 179.5 LBS | DIASTOLIC BLOOD PRESSURE: 80 MMHG | RESPIRATION RATE: 18 BRPM | BODY MASS INDEX: 25.7 KG/M2 | HEIGHT: 70 IN

## 2024-09-30 DIAGNOSIS — Z00.00 ROUTINE GENERAL MEDICAL EXAMINATION AT A HEALTH CARE FACILITY: Primary | ICD-10-CM

## 2024-09-30 DIAGNOSIS — E78.5 HYPERLIPIDEMIA, UNSPECIFIED HYPERLIPIDEMIA TYPE: ICD-10-CM

## 2024-09-30 PROBLEM — K57.30 DIVERTICULOSIS OF COLON, ACQUIRED: Status: RESOLVED | Noted: 2019-02-19 | Resolved: 2024-09-30

## 2024-09-30 PROBLEM — E66.9 OBESITY (BMI 30-39.9): Status: RESOLVED | Noted: 2022-03-08 | Resolved: 2024-09-30

## 2024-09-30 LAB
ALBUMIN SERPL BCG-MCNC: 4.4 G/DL (ref 3.5–5.2)
ALP SERPL-CCNC: 70 U/L (ref 40–150)
ALT SERPL W P-5'-P-CCNC: 17 U/L (ref 0–70)
ANION GAP SERPL CALCULATED.3IONS-SCNC: 10 MMOL/L (ref 7–15)
AST SERPL W P-5'-P-CCNC: 18 U/L (ref 0–45)
BILIRUB SERPL-MCNC: 0.3 MG/DL
BUN SERPL-MCNC: 6.8 MG/DL (ref 6–20)
CALCIUM SERPL-MCNC: 9.2 MG/DL (ref 8.8–10.4)
CHLORIDE SERPL-SCNC: 102 MMOL/L (ref 98–107)
CREAT SERPL-MCNC: 0.93 MG/DL (ref 0.67–1.17)
EGFRCR SERPLBLD CKD-EPI 2021: >90 ML/MIN/1.73M2
GLUCOSE SERPL-MCNC: 97 MG/DL (ref 70–99)
HCO3 SERPL-SCNC: 28 MMOL/L (ref 22–29)
POTASSIUM SERPL-SCNC: 4.1 MMOL/L (ref 3.4–5.3)
PROT SERPL-MCNC: 7.1 G/DL (ref 6.4–8.3)
SODIUM SERPL-SCNC: 140 MMOL/L (ref 135–145)

## 2024-09-30 PROCEDURE — 99396 PREV VISIT EST AGE 40-64: CPT

## 2024-09-30 PROCEDURE — 36415 COLL VENOUS BLD VENIPUNCTURE: CPT

## 2024-09-30 PROCEDURE — 80053 COMPREHEN METABOLIC PANEL: CPT

## 2024-09-30 ASSESSMENT — PAIN SCALES - GENERAL: PAINLEVEL: NO PAIN (0)

## 2024-09-30 NOTE — PROGRESS NOTES
"Preventive Care Visit  [facility]  BLAZE Hills CNP, Nurse Practitioner Primary Care  Sep 30, 2024      Assessment & Plan     Routine general medical examination at a health care facility  Patient is in overall general good health.     - REVIEW OF HEALTH MAINTENANCE PROTOCOL ORDERS    Hyperlipidemia, unspecified hyperlipidemia type  Patient has a history of elevated cholesterol. Repeating labs at today's visit.     - Comprehensive metabolic panel (BMP + Alb, Alk Phos, ALT, AST, Total. Bili, TP); Future  - Comprehensive metabolic panel (BMP + Alb, Alk Phos, ALT, AST, Total. Bili, TP)    Patient has been advised of split billing requirements and indicates understanding: Yes        BMI  Estimated body mass index is 25.96 kg/m  as calculated from the following:    Height as of this encounter: 1.771 m (5' 9.72\").    Weight as of this encounter: 81.4 kg (179 lb 8 oz).       Counseling  Appropriate preventive services were addressed with this patient via screening, questionnaire, or discussion as appropriate for fall prevention, nutrition, physical activity, Tobacco-use cessation, social engagement, weight loss and cognition.  Checklist reviewing preventive services available has been given to the patient.  Reviewed patient's diet, addressing concerns and/or questions.   He is at risk for lack of exercise and has been provided with information to increase physical activity for the benefit of his well-being.       Seda Franklin is a 59 year old, presenting for the following:  Physical (Not fasting for labs) and Health Maintenance (Declines vaccinations)        9/30/2024    12:39 PM   Additional Questions   Roomed by Meghna BETH LPN   Accompanied by self         9/30/2024    12:39 PM   Patient Reported Additional Medications   Patient reports taking the following new medications no new meds        Health Care Directive  Patient does not have a Health Care Directive or Living Will: " Patient states has Advance Directive and will bring in a copy to clinic.    HPI  Patient is in overall general good health. Patient has lost approximately 60 lbs. In 14 months. Weight loss was explainable as patient utilized [place] weight loss clinics as lifestyle coaches to lose the weight. Patient feels better and still utilizes many concepts he learned to continue to maintain is currently healthy weight. Patient is currently not taking any medications and refused immunizations during today's visit.     Annual Wellness Visit       Patient has been advised of split billing requirements and indicates understanding: Yes      Health Care Directive  Patient does not have a Health Care Directive or Living Will: Discussed advance care planning with patient; however, patient declined at this time.      9/29/2024   General Health   How would you rate your overall physical health? Good   Feel stress (tense, anxious, or unable to sleep) To some extent             9/29/2024   Nutrition   Diet: Regular (no restrictions)            9/29/2024   Exercise   Days per week of moderate/strenous exercise 3 days   Average minutes spent exercising at this level 30 min              9/29/2024   Social Factors   Frequency of gathering with friends or relatives Three times a week   Worry food won't last until get money to buy more No   Food not last or not have enough money for food? No   Do you have housing? (Housing is defined as stable permanent housing and does not include staying ouside in a car, in a tent, in an abandoned building, in an overnight shelter, or couch-surfing.) Yes   Are you worried about losing your housing? No   Lack of transportation? No   Unable to get utilities (heat,electricity)? No              9/29/2024   Fall Risk   Fallen 2 or more times in the past year? No   Trouble with walking or balance? No              No data to display                  9/29/2024   Dental   Dentist two times every year? Yes             No  data to display                     No data to display                  2024   TB Screening   Were you born outside of the [place]? No          Social History     Tobacco Use    Smoking status: Former     Types: Cigarettes    Smokeless tobacco: Never   Vaping Use    Vaping status: Never Used   Substance Use Topics    Alcohol use: Not Currently    Drug use: Never         Today's PHQ-2 Score:       2024    12:47 PM   PHQ-2 (  [employer])   Q1: Little interest or pleasure in doing things 0   Q2: Feeling down, depressed or hopeless 0   PHQ-2 Score 0           2024   STI Screening   New sexual partner(s) since last STI/HIV test? No      Last PSA:   Prostate Specific Antigen Screen   Date Value Ref Range Status   2023 1.59 0.00 - 3.50 ng/mL Final     ASCVD Risk   The 10-year ASCVD risk score (Pawel VAZQUEZ, et al., 2019) is: 6.2%    Values used to calculate the score:      Age: 59 years      Sex: Male      Is Non- : No      Diabetic: No      Tobacco smoker: No      Systolic Blood Pressure: 114 mmHg      Is BP treated: No      HDL Cholesterol: 54 mg/dL      Total Cholesterol: 219 mg/dL    Fracture Risk Assessment Tool  Link to Frax Calculator  Use the information below to complete the Frax calculator  : 1965  Sex: male  Weight (kg): 81.4 kg (actual weight)  Height (cm): 177.1 cm  Previous Fragility Fracture:  No  History of parent with fractured hip:  No  Current Smoking:  No  Patient has been on glucocorticoids for more than 3 months (5mg/day or more): No  Rheumatoid Arthritis on Problem List:  No  Secondary Osteoporosis on Problem List:  No  Consumes 3 or more units of alcohol per day: No  Femoral Neck BMD (g/cm2)             Reviewed and updated as needed this visit by Provider     Meds                Lab work is in process    Current providers sharing in care for this patient include:  Patient Care Team:  Vashti Barragan APRN CNP as PCP - General (Nurse  Practitioner Primary Care)  Maribell Alfaro PA-C as Physician Assistant (Urology)  Maribell Alfaro PA-C as Assigned Surgical Provider  Sadia Winter MD as Assigned PCP    The following health maintenance items are reviewed in Epic and correct as of today:  Health Maintenance   Topic Date Due    URINE DRUG SCREEN  Never done    HEPATITIS B IMMUNIZATION (1 of 3 - 19+ 3-dose series) Never done    LUNG CANCER SCREENING  Never done    INFLUENZA VACCINE (1) 09/01/2024    COVID-19 Vaccine (2 - 2024-25 season) 09/01/2024    LIPID  09/12/2024    YEARLY PREVENTIVE VISIT  09/30/2025    ANNUAL REVIEW OF HM ORDERS  09/30/2025    GLUCOSE  09/12/2026    COLORECTAL CANCER SCREENING  04/07/2028    ADVANCE CARE PLANNING  09/30/2029    DTAP/TDAP/TD IMMUNIZATION (5 - Td or Tdap) 10/23/2031    RSV VACCINE (1 - 1-dose 75+ series) 01/31/2041    HEPATITIS C SCREENING  Completed    HIV SCREENING  Completed    PHQ-2 (once per calendar year)  Completed    ZOSTER IMMUNIZATION  Completed    Pneumococcal Vaccine: Pediatrics (0 to 5 Years) and At-Risk Patients (6 to 64 Years)  Aged Out    HPV IMMUNIZATION  Aged Out    MENINGITIS IMMUNIZATION  Aged Out    RSV MONOCLONAL ANTIBODY  Aged Out       Appropriate preventive services were discussed with this patient, including applicable screening as appropriate for fall prevention, nutrition, physical activity, Tobacco-use cessation, weight loss and cognition.  Checklist reviewing preventive services available has been given to the patient.      9/29/2024   General Health   How would you rate your overall physical health? Good   Feel stress (tense, anxious, or unable to sleep) To some extent      (!) STRESS CONCERN      9/29/2024   Nutrition   Three or more servings of calcium each day? Yes   Diet: Regular (no restrictions)   How many servings of fruit and vegetables per day? 4 or more   How many sweetened beverages each day? 0-1            9/29/2024   Exercise   Days per week of moderate/strenous  exercise 3 days   Average minutes spent exercising at this level 30 min            9/29/2024   Social Factors   Frequency of gathering with friends or relatives Three times a week   Worry food won't last until get money to buy more No   Food not last or not have enough money for food? No   Do you have housing? (Housing is defined as stable permanent housing and does not include staying ouside in a car, in a tent, in an abandoned building, in an overnight shelter, or couch-surfing.) Yes   Are you worried about losing your housing? No   Lack of transportation? No   Unable to get utilities (heat,electricity)? No            9/29/2024   Fall Risk   Fallen 2 or more times in the past year? No   Trouble with walking or balance? No             9/29/2024   Dental   Dentist two times every year? Yes            9/29/2024   TB Screening   Were you born outside of the [place]? No       Today's PHQ-2 Score:       9/30/2024    12:47 PM   PHQ-2 ( 1999 [employer])   Q1: Little interest or pleasure in doing things 0   Q2: Feeling down, depressed or hopeless 0   PHQ-2 Score 0         9/29/2024   Substance Use   Alcohol more than 3/day or more than 7/wk Not Applicable   Do you use any other substances recreationally? No        Social History     Tobacco Use    Smoking status: Former     Types: Cigarettes    Smokeless tobacco: Never   Vaping Use    Vaping status: Never Used   Substance Use Topics    Alcohol use: Not Currently    Drug use: Never           9/29/2024   STI Screening   New sexual partner(s) since last STI/HIV test? No      Last PSA:   Prostate Specific Antigen Screen   Date Value Ref Range Status   09/12/2023 1.59 0.00 - 3.50 ng/mL Final     ASCVD Risk   The 10-year ASCVD risk score (Pawel VAZQUEZ, et al., 2019) is: 6.2%    Values used to calculate the score:      Age: 59 years      Sex: Male      Is Non- : No      Diabetic: No      Tobacco smoker: No      Systolic Blood Pressure: 114 mmHg      Is BP  "treated: No      HDL Cholesterol: 54 mg/dL      Total Cholesterol: 219 mg/dL       Reviewed and updated as needed this visit by Provider                    Lab work is in process      Review of Systems  Constitutional, HEENT, cardiovascular, pulmonary, GI, , musculoskeletal, neuro, skin, endocrine and psych systems are negative, except as otherwise noted.     Objective    Exam  /80 (BP Location: Right arm, Patient Position: Sitting, Cuff Size: Adult Regular)   Pulse 54   Temp 98.4  F (36.9  C) (Tympanic)   Resp 18   Ht 1.771 m (5' 9.72\")   Wt 81.4 kg (179 lb 8 oz)   SpO2 98%   BMI 25.96 kg/m     Estimated body mass index is 25.96 kg/m  as calculated from the following:    Height as of this encounter: 1.771 m (5' 9.72\").    Weight as of this encounter: 81.4 kg (179 lb 8 oz).    Physical Exam  GENERAL: alert and no distress  EYES: Eyes grossly normal to inspection, PERRL and conjunctivae and sclerae normal  HENT: ear canals and TM's normal, nose and mouth without ulcers or lesions  NECK: no adenopathy, no asymmetry, masses, or scars  RESP: lungs clear to auscultation - no rales, rhonchi or wheezes  CV: regular rate and rhythm, normal S1 S2, no S3 or S4, no murmur, click or rub, no peripheral edema  ABDOMEN: soft, nontender, no hepatosplenomegaly, no masses and bowel sounds normal  MS: no gross musculoskeletal defects noted, no edema  SKIN: no suspicious lesions or rashes  NEURO: Normal strength and tone, mentation intact and speech normal  PSYCH: mentation appears normal, affect normal/bright    Signed Electronically by: BLAZE Hills CNP    "

## 2024-09-30 NOTE — PATIENT INSTRUCTIONS
Patient Education   Preventive Care Advice   This is general advice given by our system to help you stay healthy. However, your care team may have specific advice just for you. Please talk to your care team about your preventive care needs.  Nutrition  Eat 5 or more servings of fruits and vegetables each day.  Try wheat bread, brown rice and whole grain pasta (instead of white bread, rice, and pasta).  Get enough calcium and vitamin D. Check the label on foods and aim for 100% of the RDA (recommended daily allowance).  Lifestyle  Exercise at least 150 minutes each week  (30 minutes a day, 5 days a week).  Do muscle strengthening activities 2 days a week. These help control your weight and prevent disease.  No smoking.  Wear sunscreen to prevent skin cancer.  Have a dental exam and cleaning every 6 months.  Yearly exams  See your health care team every year to talk about:  Any changes in your health.  Any medicines your care team has prescribed.  Preventive care, family planning, and ways to prevent chronic diseases.  Shots (vaccines)   HPV shots (up to age 26), if you've never had them before.  Hepatitis B shots (up to age 59), if you've never had them before.  COVID-19 shot: Get this shot when it's due.  Flu shot: Get a flu shot every year.  Tetanus shot: Get a tetanus shot every 10 years.  Pneumococcal, hepatitis A, and RSV shots: Ask your care team if you need these based on your risk.  Shingles shot (for age 50 and up)  General health tests  Diabetes screening:  Starting at age 35, Get screened for diabetes at least every 3 years.  If you are younger than age 35, ask your care team if you should be screened for diabetes.  Cholesterol test: At age 39, start having a cholesterol test every 5 years, or more often if advised.  Bone density scan (DEXA): At age 50, ask your care team if you should have this scan for osteoporosis (brittle bones).  Hepatitis C: Get tested at least once in your life.  STIs (sexually  transmitted infections)  Before age 24: Ask your care team if you should be screened for STIs.  After age 24: Get screened for STIs if you're at risk. You are at risk for STIs (including HIV) if:  You are sexually active with more than one person.  You don't use condoms every time.  You or a partner was diagnosed with a sexually transmitted infection.  If you are at risk for HIV, ask about PrEP medicine to prevent HIV.  Get tested for HIV at least once in your life, whether you are at risk for HIV or not.  Cancer screening tests  Cervical cancer screening: If you have a cervix, begin getting regular cervical cancer screening tests starting at age 21.  Breast cancer scan (mammogram): If you've ever had breasts, begin having regular mammograms starting at age 40. This is a scan to check for breast cancer.  Colon cancer screening: It is important to start screening for colon cancer at age 45.  Have a colonoscopy test every 10 years (or more often if you're at risk) Or, ask your provider about stool tests like a FIT test every year or Cologuard test every 3 years.  To learn more about your testing options, visit:   .  For help making a decision, visit:   https://bit.ly/jr98720.  Prostate cancer screening test: If you have a prostate, ask your care team if a prostate cancer screening test (PSA) at age 55 is right for you.  Lung cancer screening: If you are a current or former smoker ages 50 to 80, ask your care team if ongoing lung cancer screenings are right for you.  For informational purposes only. Not to replace the advice of your health care provider. Copyright   2023 [employer]. All rights reserved. Clinically reviewed by the [facility]itions Program. Sviral [id number] - REV 01/24.

## 2024-09-30 NOTE — H&P (VIEW-ONLY)
"Preventive Care Visit  [facility]  BLAZE Hills CNP, Nurse Practitioner Primary Care  Sep 30, 2024      Assessment & Plan     Routine general medical examination at a health care facility  Patient is in overall general good health.     - REVIEW OF HEALTH MAINTENANCE PROTOCOL ORDERS    Hyperlipidemia, unspecified hyperlipidemia type  Patient has a history of elevated cholesterol. Repeating labs at today's visit.     - Comprehensive metabolic panel (BMP + Alb, Alk Phos, ALT, AST, Total. Bili, TP); Future  - Comprehensive metabolic panel (BMP + Alb, Alk Phos, ALT, AST, Total. Bili, TP)    Patient has been advised of split billing requirements and indicates understanding: Yes        BMI  Estimated body mass index is 25.96 kg/m  as calculated from the following:    Height as of this encounter: 1.771 m (5' 9.72\").    Weight as of this encounter: 81.4 kg (179 lb 8 oz).       Counseling  Appropriate preventive services were addressed with this patient via screening, questionnaire, or discussion as appropriate for fall prevention, nutrition, physical activity, Tobacco-use cessation, social engagement, weight loss and cognition.  Checklist reviewing preventive services available has been given to the patient.  Reviewed patient's diet, addressing concerns and/or questions.   He is at risk for lack of exercise and has been provided with information to increase physical activity for the benefit of his well-being.       Seda Franklin is a 59 year old, presenting for the following:  Physical (Not fasting for labs) and Health Maintenance (Declines vaccinations)        9/30/2024    12:39 PM   Additional Questions   Roomed by Meghna BETH LPN   Accompanied by self         9/30/2024    12:39 PM   Patient Reported Additional Medications   Patient reports taking the following new medications no new meds        Health Care Directive  Patient does not have a Health Care Directive or Living Will: " Patient states has Advance Directive and will bring in a copy to clinic.    HPI  Patient is in overall general good health. Patient has lost approximately 60 lbs. In 14 months. Weight loss was explainable as patient utilized [place] weight loss clinics as lifestyle coaches to lose the weight. Patient feels better and still utilizes many concepts he learned to continue to maintain is currently healthy weight. Patient is currently not taking any medications and refused immunizations during today's visit.     Annual Wellness Visit       Patient has been advised of split billing requirements and indicates understanding: Yes      Health Care Directive  Patient does not have a Health Care Directive or Living Will: Discussed advance care planning with patient; however, patient declined at this time.      9/29/2024   General Health   How would you rate your overall physical health? Good   Feel stress (tense, anxious, or unable to sleep) To some extent             9/29/2024   Nutrition   Diet: Regular (no restrictions)            9/29/2024   Exercise   Days per week of moderate/strenous exercise 3 days   Average minutes spent exercising at this level 30 min              9/29/2024   Social Factors   Frequency of gathering with friends or relatives Three times a week   Worry food won't last until get money to buy more No   Food not last or not have enough money for food? No   Do you have housing? (Housing is defined as stable permanent housing and does not include staying ouside in a car, in a tent, in an abandoned building, in an overnight shelter, or couch-surfing.) Yes   Are you worried about losing your housing? No   Lack of transportation? No   Unable to get utilities (heat,electricity)? No              9/29/2024   Fall Risk   Fallen 2 or more times in the past year? No   Trouble with walking or balance? No              No data to display                  9/29/2024   Dental   Dentist two times every year? Yes             No  data to display                     No data to display                  2024   TB Screening   Were you born outside of the [place]? No          Social History     Tobacco Use    Smoking status: Former     Types: Cigarettes    Smokeless tobacco: Never   Vaping Use    Vaping status: Never Used   Substance Use Topics    Alcohol use: Not Currently    Drug use: Never         Today's PHQ-2 Score:       2024    12:47 PM   PHQ-2 (  [employer])   Q1: Little interest or pleasure in doing things 0   Q2: Feeling down, depressed or hopeless 0   PHQ-2 Score 0           2024   STI Screening   New sexual partner(s) since last STI/HIV test? No      Last PSA:   Prostate Specific Antigen Screen   Date Value Ref Range Status   2023 1.59 0.00 - 3.50 ng/mL Final     ASCVD Risk   The 10-year ASCVD risk score (Pawel VAZQUEZ, et al., 2019) is: 6.2%    Values used to calculate the score:      Age: 59 years      Sex: Male      Is Non- : No      Diabetic: No      Tobacco smoker: No      Systolic Blood Pressure: 114 mmHg      Is BP treated: No      HDL Cholesterol: 54 mg/dL      Total Cholesterol: 219 mg/dL    Fracture Risk Assessment Tool  Link to Frax Calculator  Use the information below to complete the Frax calculator  : 1965  Sex: male  Weight (kg): 81.4 kg (actual weight)  Height (cm): 177.1 cm  Previous Fragility Fracture:  No  History of parent with fractured hip:  No  Current Smoking:  No  Patient has been on glucocorticoids for more than 3 months (5mg/day or more): No  Rheumatoid Arthritis on Problem List:  No  Secondary Osteoporosis on Problem List:  No  Consumes 3 or more units of alcohol per day: No  Femoral Neck BMD (g/cm2)             Reviewed and updated as needed this visit by Provider     Meds                Lab work is in process    Current providers sharing in care for this patient include:  Patient Care Team:  Vashti Barraagn APRN CNP as PCP - General (Nurse  Practitioner Primary Care)  Maribell Alfaro PA-C as Physician Assistant (Urology)  Maribell Alfaro PA-C as Assigned Surgical Provider  Sadia Winter MD as Assigned PCP    The following health maintenance items are reviewed in Epic and correct as of today:  Health Maintenance   Topic Date Due    URINE DRUG SCREEN  Never done    HEPATITIS B IMMUNIZATION (1 of 3 - 19+ 3-dose series) Never done    LUNG CANCER SCREENING  Never done    INFLUENZA VACCINE (1) 09/01/2024    COVID-19 Vaccine (2 - 2024-25 season) 09/01/2024    LIPID  09/12/2024    YEARLY PREVENTIVE VISIT  09/30/2025    ANNUAL REVIEW OF HM ORDERS  09/30/2025    GLUCOSE  09/12/2026    COLORECTAL CANCER SCREENING  04/07/2028    ADVANCE CARE PLANNING  09/30/2029    DTAP/TDAP/TD IMMUNIZATION (5 - Td or Tdap) 10/23/2031    RSV VACCINE (1 - 1-dose 75+ series) 01/31/2041    HEPATITIS C SCREENING  Completed    HIV SCREENING  Completed    PHQ-2 (once per calendar year)  Completed    ZOSTER IMMUNIZATION  Completed    Pneumococcal Vaccine: Pediatrics (0 to 5 Years) and At-Risk Patients (6 to 64 Years)  Aged Out    HPV IMMUNIZATION  Aged Out    MENINGITIS IMMUNIZATION  Aged Out    RSV MONOCLONAL ANTIBODY  Aged Out       Appropriate preventive services were discussed with this patient, including applicable screening as appropriate for fall prevention, nutrition, physical activity, Tobacco-use cessation, weight loss and cognition.  Checklist reviewing preventive services available has been given to the patient.      9/29/2024   General Health   How would you rate your overall physical health? Good   Feel stress (tense, anxious, or unable to sleep) To some extent      (!) STRESS CONCERN      9/29/2024   Nutrition   Three or more servings of calcium each day? Yes   Diet: Regular (no restrictions)   How many servings of fruit and vegetables per day? 4 or more   How many sweetened beverages each day? 0-1            9/29/2024   Exercise   Days per week of moderate/strenous  exercise 3 days   Average minutes spent exercising at this level 30 min            9/29/2024   Social Factors   Frequency of gathering with friends or relatives Three times a week   Worry food won't last until get money to buy more No   Food not last or not have enough money for food? No   Do you have housing? (Housing is defined as stable permanent housing and does not include staying ouside in a car, in a tent, in an abandoned building, in an overnight shelter, or couch-surfing.) Yes   Are you worried about losing your housing? No   Lack of transportation? No   Unable to get utilities (heat,electricity)? No            9/29/2024   Fall Risk   Fallen 2 or more times in the past year? No   Trouble with walking or balance? No             9/29/2024   Dental   Dentist two times every year? Yes            9/29/2024   TB Screening   Were you born outside of the [place]? No       Today's PHQ-2 Score:       9/30/2024    12:47 PM   PHQ-2 ( 1999 [employer])   Q1: Little interest or pleasure in doing things 0   Q2: Feeling down, depressed or hopeless 0   PHQ-2 Score 0         9/29/2024   Substance Use   Alcohol more than 3/day or more than 7/wk Not Applicable   Do you use any other substances recreationally? No        Social History     Tobacco Use    Smoking status: Former     Types: Cigarettes    Smokeless tobacco: Never   Vaping Use    Vaping status: Never Used   Substance Use Topics    Alcohol use: Not Currently    Drug use: Never           9/29/2024   STI Screening   New sexual partner(s) since last STI/HIV test? No      Last PSA:   Prostate Specific Antigen Screen   Date Value Ref Range Status   09/12/2023 1.59 0.00 - 3.50 ng/mL Final     ASCVD Risk   The 10-year ASCVD risk score (Pawel VAZQUEZ, et al., 2019) is: 6.2%    Values used to calculate the score:      Age: 59 years      Sex: Male      Is Non- : No      Diabetic: No      Tobacco smoker: No      Systolic Blood Pressure: 114 mmHg      Is BP  "treated: No      HDL Cholesterol: 54 mg/dL      Total Cholesterol: 219 mg/dL       Reviewed and updated as needed this visit by Provider                    Lab work is in process      Review of Systems  Constitutional, HEENT, cardiovascular, pulmonary, GI, , musculoskeletal, neuro, skin, endocrine and psych systems are negative, except as otherwise noted.     Objective    Exam  /80 (BP Location: Right arm, Patient Position: Sitting, Cuff Size: Adult Regular)   Pulse 54   Temp 98.4  F (36.9  C) (Tympanic)   Resp 18   Ht 1.771 m (5' 9.72\")   Wt 81.4 kg (179 lb 8 oz)   SpO2 98%   BMI 25.96 kg/m     Estimated body mass index is 25.96 kg/m  as calculated from the following:    Height as of this encounter: 1.771 m (5' 9.72\").    Weight as of this encounter: 81.4 kg (179 lb 8 oz).    Physical Exam  GENERAL: alert and no distress  EYES: Eyes grossly normal to inspection, PERRL and conjunctivae and sclerae normal  HENT: ear canals and TM's normal, nose and mouth without ulcers or lesions  NECK: no adenopathy, no asymmetry, masses, or scars  RESP: lungs clear to auscultation - no rales, rhonchi or wheezes  CV: regular rate and rhythm, normal S1 S2, no S3 or S4, no murmur, click or rub, no peripheral edema  ABDOMEN: soft, nontender, no hepatosplenomegaly, no masses and bowel sounds normal  MS: no gross musculoskeletal defects noted, no edema  SKIN: no suspicious lesions or rashes  NEURO: Normal strength and tone, mentation intact and speech normal  PSYCH: mentation appears normal, affect normal/bright    Signed Electronically by: BLAZE Hills CNP    "

## 2024-10-02 ENCOUNTER — MYC MEDICAL ADVICE (OUTPATIENT)
Dept: FAMILY MEDICINE | Facility: CLINIC | Age: 59
End: 2024-10-02
Payer: COMMERCIAL

## 2024-10-06 ENCOUNTER — MYC MEDICAL ADVICE (OUTPATIENT)
Dept: FAMILY MEDICINE | Facility: CLINIC | Age: 59
End: 2024-10-06
Payer: COMMERCIAL

## 2024-10-06 DIAGNOSIS — E78.5 HYPERLIPIDEMIA, UNSPECIFIED HYPERLIPIDEMIA TYPE: Primary | ICD-10-CM

## 2024-10-11 ENCOUNTER — LAB (OUTPATIENT)
Dept: LAB | Facility: CLINIC | Age: 59
End: 2024-10-11
Payer: COMMERCIAL

## 2024-10-11 DIAGNOSIS — E78.5 HYPERLIPIDEMIA, UNSPECIFIED HYPERLIPIDEMIA TYPE: ICD-10-CM

## 2024-10-11 LAB
CHOLEST SERPL-MCNC: 197 MG/DL
FASTING STATUS PATIENT QL REPORTED: YES
HDLC SERPL-MCNC: 41 MG/DL
LDLC SERPL CALC-MCNC: 123 MG/DL
NONHDLC SERPL-MCNC: 156 MG/DL
TRIGL SERPL-MCNC: 167 MG/DL

## 2024-10-11 PROCEDURE — 36415 COLL VENOUS BLD VENIPUNCTURE: CPT

## 2024-10-11 PROCEDURE — 80061 LIPID PANEL: CPT

## 2024-10-12 ENCOUNTER — APPOINTMENT (OUTPATIENT)
Dept: CT IMAGING | Facility: CLINIC | Age: 59
End: 2024-10-12
Attending: EMERGENCY MEDICINE
Payer: COMMERCIAL

## 2024-10-12 ENCOUNTER — HOSPITAL ENCOUNTER (EMERGENCY)
Facility: CLINIC | Age: 59
Discharge: HOME | End: 2024-10-12
Attending: EMERGENCY MEDICINE | Admitting: EMERGENCY MEDICINE
Payer: COMMERCIAL

## 2024-10-12 VITALS
HEART RATE: 47 BPM | DIASTOLIC BLOOD PRESSURE: 73 MMHG | RESPIRATION RATE: 18 BRPM | OXYGEN SATURATION: 95 % | SYSTOLIC BLOOD PRESSURE: 131 MMHG

## 2024-10-12 DIAGNOSIS — R10.13 EPIGASTRIC PAIN: ICD-10-CM

## 2024-10-12 DIAGNOSIS — K29.00 OTHER ACUTE GASTRITIS WITHOUT HEMORRHAGE: ICD-10-CM

## 2024-10-12 LAB
ALBUMIN SERPL BCG-MCNC: 4.1 G/DL (ref 3.5–5.2)
ALP SERPL-CCNC: 74 U/L (ref 40–150)
ALT SERPL W P-5'-P-CCNC: 20 U/L (ref 0–70)
ANION GAP SERPL CALCULATED.3IONS-SCNC: 13 MMOL/L (ref 7–15)
AST SERPL W P-5'-P-CCNC: 35 U/L (ref 0–45)
BASOPHILS # BLD AUTO: 0.1 10E3/UL (ref 0–0.2)
BASOPHILS NFR BLD AUTO: 0 %
BILIRUB SERPL-MCNC: 0.5 MG/DL
BUN SERPL-MCNC: 14.1 MG/DL (ref 6–20)
CALCIUM SERPL-MCNC: 9.4 MG/DL (ref 8.8–10.4)
CHLORIDE SERPL-SCNC: 100 MMOL/L (ref 98–107)
CREAT SERPL-MCNC: 0.95 MG/DL (ref 0.67–1.17)
EGFRCR SERPLBLD CKD-EPI 2021: >90 ML/MIN/1.73M2
EOSINOPHIL # BLD AUTO: 0.1 10E3/UL (ref 0–0.7)
EOSINOPHIL NFR BLD AUTO: 1 %
ERYTHROCYTE [DISTWIDTH] IN BLOOD BY AUTOMATED COUNT: 12.2 % (ref 10–15)
GLUCOSE SERPL-MCNC: 159 MG/DL (ref 70–99)
HCO3 SERPL-SCNC: 23 MMOL/L (ref 22–29)
HCT VFR BLD AUTO: 44.7 % (ref 40–53)
HGB BLD-MCNC: 15.3 G/DL (ref 13.3–17.7)
IMM GRANULOCYTES # BLD: 0 10E3/UL
IMM GRANULOCYTES NFR BLD: 0 %
LIPASE SERPL-CCNC: 61 U/L (ref 13–60)
LYMPHOCYTES # BLD AUTO: 1.5 10E3/UL (ref 0.8–5.3)
LYMPHOCYTES NFR BLD AUTO: 12 %
MCH RBC QN AUTO: 29.8 PG (ref 26.5–33)
MCHC RBC AUTO-ENTMCNC: 34.2 G/DL (ref 31.5–36.5)
MCV RBC AUTO: 87 FL (ref 78–100)
MONOCYTES # BLD AUTO: 0.6 10E3/UL (ref 0–1.3)
MONOCYTES NFR BLD AUTO: 5 %
NEUTROPHILS # BLD AUTO: 10.1 10E3/UL (ref 1.6–8.3)
NEUTROPHILS NFR BLD AUTO: 82 %
NRBC # BLD AUTO: 0 10E3/UL
NRBC BLD AUTO-RTO: 0 /100
PLATELET # BLD AUTO: 173 10E3/UL (ref 150–450)
POTASSIUM SERPL-SCNC: 5.4 MMOL/L (ref 3.4–5.3)
PROT SERPL-MCNC: 7.2 G/DL (ref 6.4–8.3)
RBC # BLD AUTO: 5.13 10E6/UL (ref 4.4–5.9)
SODIUM SERPL-SCNC: 136 MMOL/L (ref 135–145)
WBC # BLD AUTO: 12.4 10E3/UL (ref 4–11)

## 2024-10-12 PROCEDURE — 96374 THER/PROPH/DIAG INJ IV PUSH: CPT | Mod: 59

## 2024-10-12 PROCEDURE — 36415 COLL VENOUS BLD VENIPUNCTURE: CPT | Performed by: EMERGENCY MEDICINE

## 2024-10-12 PROCEDURE — 96375 TX/PRO/DX INJ NEW DRUG ADDON: CPT

## 2024-10-12 PROCEDURE — 250N000009 HC RX 250: Performed by: EMERGENCY MEDICINE

## 2024-10-12 PROCEDURE — 74177 CT ABD & PELVIS W/CONTRAST: CPT

## 2024-10-12 PROCEDURE — 250N000011 HC RX IP 250 OP 636: Performed by: EMERGENCY MEDICINE

## 2024-10-12 PROCEDURE — 82040 ASSAY OF SERUM ALBUMIN: CPT | Performed by: EMERGENCY MEDICINE

## 2024-10-12 PROCEDURE — 83690 ASSAY OF LIPASE: CPT | Performed by: EMERGENCY MEDICINE

## 2024-10-12 PROCEDURE — 76705 ECHO EXAM OF ABDOMEN: CPT

## 2024-10-12 PROCEDURE — 85004 AUTOMATED DIFF WBC COUNT: CPT | Performed by: EMERGENCY MEDICINE

## 2024-10-12 PROCEDURE — 99285 EMERGENCY DEPT VISIT HI MDM: CPT | Mod: 25

## 2024-10-12 PROCEDURE — 76705 ECHO EXAM OF ABDOMEN: CPT | Mod: 26 | Performed by: EMERGENCY MEDICINE

## 2024-10-12 PROCEDURE — 99284 EMERGENCY DEPT VISIT MOD MDM: CPT | Mod: 25 | Performed by: EMERGENCY MEDICINE

## 2024-10-12 RX ORDER — ONDANSETRON 4 MG/1
4 TABLET, ORALLY DISINTEGRATING ORAL EVERY 8 HOURS PRN
Qty: 10 TABLET | Refills: 0 | Status: SHIPPED | OUTPATIENT
Start: 2024-10-12

## 2024-10-12 RX ORDER — PANTOPRAZOLE SODIUM 40 MG/1
40 TABLET, DELAYED RELEASE ORAL DAILY
Qty: 30 TABLET | Refills: 0 | Status: SHIPPED | OUTPATIENT
Start: 2024-10-12 | End: 2024-11-04

## 2024-10-12 RX ORDER — IOPAMIDOL 755 MG/ML
88 INJECTION, SOLUTION INTRAVASCULAR ONCE
Status: COMPLETED | OUTPATIENT
Start: 2024-10-12 | End: 2024-10-12

## 2024-10-12 RX ORDER — HYDROMORPHONE HYDROCHLORIDE 1 MG/ML
0.5 INJECTION, SOLUTION INTRAMUSCULAR; INTRAVENOUS; SUBCUTANEOUS
Status: DISCONTINUED | OUTPATIENT
Start: 2024-10-12 | End: 2024-10-12 | Stop reason: HOSPADM

## 2024-10-12 RX ADMIN — HYDROMORPHONE HYDROCHLORIDE 0.5 MG: 1 INJECTION, SOLUTION INTRAMUSCULAR; INTRAVENOUS; SUBCUTANEOUS at 06:09

## 2024-10-12 RX ADMIN — SODIUM CHLORIDE 62 ML: 9 INJECTION, SOLUTION INTRAVENOUS at 05:38

## 2024-10-12 RX ADMIN — PANTOPRAZOLE SODIUM 40 MG: 40 INJECTION, POWDER, FOR SOLUTION INTRAVENOUS at 06:12

## 2024-10-12 RX ADMIN — IOPAMIDOL 88 ML: 755 INJECTION, SOLUTION INTRAVENOUS at 05:38

## 2024-10-12 ASSESSMENT — ACTIVITIES OF DAILY LIVING (ADL): ADLS_ACUITY_SCORE: 35

## 2024-10-12 ASSESSMENT — COLUMBIA-SUICIDE SEVERITY RATING SCALE - C-SSRS
2. HAVE YOU ACTUALLY HAD ANY THOUGHTS OF KILLING YOURSELF IN THE PAST MONTH?: NO
6. HAVE YOU EVER DONE ANYTHING, STARTED TO DO ANYTHING, OR PREPARED TO DO ANYTHING TO END YOUR LIFE?: NO
1. IN THE PAST MONTH, HAVE YOU WISHED YOU WERE DEAD OR WISHED YOU COULD GO TO SLEEP AND NOT WAKE UP?: NO

## 2024-10-12 NOTE — DISCHARGE INSTRUCTIONS
Start taking pantoprazole (Protonix) daily.  Drink plenty of water.  Avoid foods and drinks that are going to make this worse such as coffee, tea, alcohol.  Do not smoke.  Eat a diet that is low in fat and high in fiber with foods such as whole grains, fruits, vegetables, fish.  Do avoid acidic foods such as tomatoes and citrus.  Other foods that you could take that are easier on the stomach or foods such as yogurt or cottage cheese, avocados, potatoes, and legumes.     Return for worsening pain, repeated vomiting, or other concerns.  Otherwise follow-up in surgery clinic to schedule endoscopy.

## 2024-10-12 NOTE — ED NOTES
Chart accessed 10/12/24 to assist pt, [facility] is [facility]. [facility] [place].

## 2024-10-12 NOTE — ED PROVIDER NOTES
History     Chief Complaint   Patient presents with    Abdominal Pain    Constipation     HPI  John Guzman is a 59 year old male who presents for evaluation of epigastric abdominal pain.  The patient reports that he has had intermittent pain over the last 2 days, getting worse and more consistent.  Pain is severe in the epigastric region and he did vomit once this morning prior to his arrival here.  Nausea is mild currently.  He has not taken thing for symptoms.  No prior abdominal surgeries.  No fevers, chest pain, cough, difficulty breathing.  No diarrhea or urinary symptoms.    Allergies:  Allergies   Allergen Reactions    Cymbalta [Duloxetine Hcl]      Felt like he had covid       Problem List:    Patient Active Problem List    Diagnosis Date Noted    Routine general medical examination at a health care facility 09/30/2024     Priority: Medium    Chronic right shoulder pain 03/08/2022     Priority: Medium    Status post cervical discectomy 03/08/2022     Priority: Medium    Prediabetes 07/22/2021     Priority: Medium    Hyperlipidemia 05/03/2021     Priority: Medium    Lumbar arthropathy 12/23/2020     Priority: Medium    Cervical disc herniation 08/11/2020     Priority: Medium     Formatting of this note might be different from the original.  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  6/2014- left lateral scap and left lateral mid low back, mild, negative margins   9/2013 moderate x 3 at the left infra scap. back superior and inferior (positive margins) & left prox. Lat.arm. Observe the left inferior scap back site.      Impaired fasting glucose 12/28/2012      Priority: Medium    Erectile dysfunction 12/21/2011     Priority: Medium    Pronation of feet 07/13/2009     Priority: Medium        Past Medical History:    Past Medical History:   Diagnosis Date    Arthritis 9/2000    High cholesterol        Past Surgical History:    Past Surgical History:   Procedure Laterality Date    CERVICAL ARTHOPLASTY/ ARTIFIC DISC, SINGLE      COLONOSCOPY N/A 04/07/2023    Procedure: COLONOSCOPY, FLEXIBLE, WITH LESION REMOVAL USING SNARE;  Surgeon: Shankar Acosta DO;  Location: [facility]    ESOPHAGOSCOPY, GASTROSCOPY, DUODENOSCOPY (EGD), COMBINED N/A 04/07/2023    Procedure: Esophagoscopy, gastroscopy, duodenoscopy (EGD), BIOPSY;  Surgeon: Shankar Acosta DO;  Location: [facility]    HEAD & NECK SURGERY  6/20    Cervical disk replacement    ORTHOPEDIC SURGERY  2/2010    Plantars fascitis    plantar Fachitiis surgery Left        Family History:    Family History   Problem Relation Age of Onset    Brain Tumor Mother     Other Cancer Mother         Brain cancer    Hyperlipidemia Father     Other - See Comments Sister         iron lung    No Known Problems Maternal Grandfather     No Known Problems Paternal Grandmother     Alcoholism Sister     No Known Problems Paternal Grandfather     Diabetes Brother     Obesity Brother        Social History:  Marital Status:   [2]  Social History     Tobacco Use    Smoking status: Former     Types: Cigarettes    Smokeless tobacco: Never   Vaping Use    Vaping status: Never Used   Substance Use Topics    Alcohol use: Not Currently    Drug use: Never        Medications:    ondansetron (ZOFRAN ODT) 4 MG ODT tab  pantoprazole (PROTONIX) 40 MG EC tablet          Review of Systems    Physical Exam   BP: 130/72  Pulse: (!) 47  Resp: 18  SpO2: 99 %      Physical Exam  Constitutional:       General: He is in acute distress.      Appearance: He is well-developed.   HENT:      Head: Normocephalic and atraumatic.   Cardiovascular:      Rate and Rhythm:  Normal rate.   Pulmonary:      Effort: No respiratory distress.      Breath sounds: No stridor.   Abdominal:      Tenderness: There is abdominal tenderness in the epigastric area. There is no guarding or rebound.   Skin:     General: Skin is warm and dry.   Neurological:      Mental Status: He is alert.         ED Course        Procedures    Results for orders placed during the hospital encounter of 10/12/24    POC US ABDOMEN LIMITED    [facility] Procedure Note    Limited Bedside ED Gallbladder  Ultrasound:    PROCEDURE: PERFORMED BY: Dr. Ari Silva MD  INDICATIONS:  RUQ/Epigastric Pain  PROBE:  Low frequency convex probe  BODY LOCATION: Abdomen  FINDINGS:   An ultrasound of the gallbladder was performed using longitudinal and transverse views.  Gallstone(s):  Absent  Gallbladder sludge:  Absent  Sonographic Acevedo's sign:  Absent  Gallbladder wall thickening (greater than 4 mm):  Absent  Pericholecystic fluid: Absent  Common bile duct (dilated if internal diameter greater than 6 mm): 3 mm  INTERPRETATION:  The gallbladder evaluation is normal with no gallstones/sludge, no sonographic Acevedo's sign, no GB wall thickening, no pericholecystic fluid, and without evidence of cholelithiasis or cholecystitis.  IMAGE DOCUMENTATION: Images were archived to PACs system.            Critical Care time:  none              Results for orders placed or performed during the hospital encounter of 10/12/24 (from the past 24 hour(s))   CBC with platelets, differential    Narrative    The following orders were created for panel order CBC with platelets, differential.  Procedure                               Abnormality         Status                     ---------                               -----------         ------                     CBC with platelets and d...[[id number]]  Abnormal            Final result                 Please view results for these tests on the individual orders.   Comprehensive  metabolic panel   Result Value Ref Range    Sodium 136 135 - 145 mmol/L    Potassium 5.4 (H) 3.4 - 5.3 mmol/L    Carbon Dioxide (CO2) 23 22 - 29 mmol/L    Anion Gap 13 7 - 15 mmol/L    Urea Nitrogen 14.1 6.0 - 20.0 mg/dL    Creatinine 0.95 0.67 - 1.17 mg/dL    GFR Estimate >90 >60 mL/min/1.73m2    Calcium 9.4 8.8 - 10.4 mg/dL    Chloride 100 98 - 107 mmol/L    Glucose 159 (H) 70 - 99 mg/dL    Alkaline Phosphatase 74 40 - 150 U/L    AST 35 0 - 45 U/L    ALT 20 0 - 70 U/L    Protein Total 7.2 6.4 - 8.3 g/dL    Albumin 4.1 3.5 - 5.2 g/dL    Bilirubin Total 0.5 <=1.2 mg/dL   CBC with platelets and differential   Result Value Ref Range    WBC Count 12.4 (H) 4.0 - 11.0 10e3/uL    RBC Count 5.13 4.40 - 5.90 10e6/uL    Hemoglobin 15.3 13.3 - 17.7 g/dL    Hematocrit 44.7 40.0 - 53.0 %    MCV 87 78 - 100 fL    MCH 29.8 26.5 - 33.0 pg    MCHC 34.2 31.5 - 36.5 g/dL    RDW 12.2 10.0 - 15.0 %    Platelet Count 173 150 - 450 10e3/uL    % Neutrophils 82 %    % Lymphocytes 12 %    % Monocytes 5 %    % Eosinophils 1 %    % Basophils 0 %    % Immature Granulocytes 0 %    NRBCs per 100 WBC 0 <1 /100    Absolute Neutrophils 10.1 (H) 1.6 - 8.3 10e3/uL    Absolute Lymphocytes 1.5 0.8 - 5.3 10e3/uL    Absolute Monocytes 0.6 0.0 - 1.3 10e3/uL    Absolute Eosinophils 0.1 0.0 - 0.7 10e3/uL    Absolute Basophils 0.1 0.0 - 0.2 10e3/uL    Absolute Immature Granulocytes 0.0 <=0.4 10e3/uL    Absolute NRBCs 0.0 10e3/uL   Lipase   Result Value Ref Range    Lipase 61 (H) 13 - 60 U/L   POC US ABDOMEN LIMITED    [facility] Procedure Note      Limited Bedside ED Gallbladder  Ultrasound:    PROCEDURE: PERFORMED BY: Dr. Ari Silva MD  INDICATIONS:  RUQ/Epigastric Pain  PROBE:  Low frequency convex probe  BODY LOCATION: Abdomen  FINDINGS:   An ultrasound of the gallbladder was performed using longitudinal and transverse views.  Gallstone(s):  Absent  Gallbladder sludge:  Absent  Sonographic Acevedo's sign:   Absent  Gallbladder wall thickening (greater than 4 mm):  Absent  Pericholecystic fluid: Absent  Common bile duct (dilated if internal diameter greater than 6 mm): 3 mm   INTERPRETATION:  The gallbladder evaluation is normal with no gallstones/sludge, no sonographic Acevedo's sign, no GB wall thickening, no pericholecystic fluid, and without evidence of cholelithiasis or cholecystitis.  IMAGE DOCUMENTATION: Images were archived to PACs system.      CT Abdomen Pelvis w Contrast    Narrative    EXAM: CT ABDOMEN PELVIS W CONTRAST  LOCATION: [facility]  DATE: 10/12/2024    INDICATION: Epigastric pain with vomiting  COMPARISON: None.  TECHNIQUE: CT scan of the abdomen and pelvis was performed following injection of IV contrast. Multiplanar reformats were obtained. Dose reduction techniques were used.  CONTRAST: 88 mL Isovue 370    FINDINGS:   LOWER CHEST: Normal.    HEPATOBILIARY: Normal liver and gallbladder.    PANCREAS: Normal.    SPLEEN: Normal.    ADRENAL GLANDS: Normal.    KIDNEYS/BLADDER: Normal. Bilateral too small to characterize cystic lesions are visually benign cysts and do not require follow-up.    BOWEL: Severe thickening of the gastric body and antrum with a focal ulcer in the posterior wall of the gastric body. Adjacent perigastric fat stranding present. Remainder of the gastrointestinal tract including the appendix is normal. No free fluid or   intraperitoneal free air.    LYMPH NODES: Tiny perigastric lymph nodes adjacent to the gastric antrum are compatible with reactive nature.     VASCULATURE: Mild aortoiliac atherosclerotic calcifications. No abdominal aortic aneurysm.    PELVIC ORGANS: Mild prostate enlargement.    MUSCULOSKELETAL: Mild spinal degenerative changes. No suspicious osseous lesions or acute fractures.        Impression    IMPRESSION:     1.  Severe gastritis involving the gastric and antrum with focal ulceration in the posterior wall of the gastric body. No  gastric perforation.    2.  Mild prostate enlargement.       Medications   HYDROmorphone (PF) (DILAUDID) injection 0.5 mg (0.5 mg Intravenous $Given 10/12/24 0609)   CT Saline (62 mLs Intravenous $Given 10/12/24 0538)   iopamidol (ISOVUE-370) solution 88 mL (88 mLs Intravenous $Given 10/12/24 0538)   pantoprazole (PROTONIX) IV push injection 40 mg (40 mg Intravenous $Given 10/12/24 0612)       Assessments & Plan (with Medical Decision Making)   59-year-old male who presents for epigastric abdominal pain with nausea and vomiting.  Uncomfortable upon arrival.  He is given IV hydromorphone.  Bedside ultrasound is negative for signs of cholelithiasis or cholecystitis.  He has a leukocytosis of 12.4.  Lipase is mildly elevated but nonspecific.  LFTs with a within normal limits.  CT of the abdomen and pelvis obtained, images interpreted independently and show inflammation around the stomach.  Radiology read is pending.  The patient is given IV pantoprazole for symptoms.  He is signed out to Dr. Haynes at change of shift to follow-up on the rest of the CT scan and final disposition.    Addendum: The CT of the abdomen and pelvis was read by the radiologist and confirms my own interpretation, gastritis without signs of perforation.  I have discussed this with the patient and he is feeling somewhat better.  I will start him on pantoprazole and have ordered outpatient follow-up with surgery to discuss endoscopy.  He is told to return if he has worsening of his symptoms and we discussed dietary changes to help control his symptoms.  The patient is in agreement with this plan.    I have reviewed the nursing notes.    I have reviewed the findings, diagnosis, plan and need for follow up with the patient.           Medical Decision Making  The patient's presentation was of high complexity (an acute health issue posing potential threat to life or bodily function).    The patient's evaluation involved:  ordering and/or review of 3+  test(s) in this encounter (see separate area of note for details)  independent interpretation of testing performed by another health professional (see separate area of note for details)    The patient's management necessitated high risk (a parenteral controlled substance).        New Prescriptions    ONDANSETRON (ZOFRAN ODT) 4 MG ODT TAB    Take 1 tablet (4 mg) by mouth every 8 hours as needed for nausea.    PANTOPRAZOLE (PROTONIX) 40 MG EC TABLET    Take 1 tablet (40 mg) by mouth daily for 30 doses.       Final diagnoses:   Epigastric pain   Other acute gastritis without hemorrhage       10/12/2024   [facility] EMERGENCY DEPT       Ari Silva MD  10/12/24 2031       Ari Silva MD  10/12/24 [id number]

## 2024-10-12 NOTE — ED TRIAGE NOTES
Pt arrived via private car with upper abd pain with constipation. Pt reported abd pain since Thursday with radiation into back, pain increases after eating. Pt denied fevers      Triage Assessment (Adult)       Row Name 10/12/24 0515          Triage Assessment    Airway WDL WDL        Respiratory WDL    Respiratory WDL WDL        Skin Circulation/Temperature WDL    Skin Circulation/Temperature WDL WDL        Cardiac WDL    Cardiac WDL WDL        Peripheral/Neurovascular WDL    Peripheral Neurovascular WDL WDL        Cognitive/Neuro/Behavioral WDL    Cognitive/Neuro/Behavioral WDL WDL

## 2024-10-14 ENCOUNTER — OFFICE VISIT (OUTPATIENT)
Dept: SURGERY | Facility: CLINIC | Age: 59
End: 2024-10-14
Attending: EMERGENCY MEDICINE
Payer: COMMERCIAL

## 2024-10-14 VITALS
BODY MASS INDEX: 25.74 KG/M2 | TEMPERATURE: 97.8 F | DIASTOLIC BLOOD PRESSURE: 71 MMHG | SYSTOLIC BLOOD PRESSURE: 113 MMHG | HEART RATE: 65 BPM | WEIGHT: 178 LBS

## 2024-10-14 DIAGNOSIS — R10.13 EPIGASTRIC PAIN: Primary | ICD-10-CM

## 2024-10-14 DIAGNOSIS — K29.00 OTHER ACUTE GASTRITIS WITHOUT HEMORRHAGE: ICD-10-CM

## 2024-10-14 PROCEDURE — 99243 OFF/OP CNSLTJ NEW/EST LOW 30: CPT | Performed by: STUDENT IN AN ORGANIZED HEALTH CARE EDUCATION/TRAINING PROGRAM

## 2024-10-14 NOTE — LETTER
"10/14/2024      John Guzman  [id number] [address]      Dear Colleague,    Thank you for referring your patient, John Guzman, to the [facility]. Please see a copy of my visit note below.    Surgical Consultation/History and Physical  [facility] Surgery    John is seen in consultation for epigastric pain, at the request of BLAZE Hills CNP.    Chief Complaint: Epigastric pain    History of Present Illness: John Guzman is a 59 year old male presents to surgery clinic for evaluation after he was previously seen in the emergency department for an episode of severe epigastric pain.  This was associated with nausea and a \"sharp stabbing\" sensation in his epigastrium that did not radiate to the right or left.  No associated vomiting, no changes in bowel habits, no noticeable blood in his stool.  He states that something similar happened to him about 10 years ago but this resolved just with medication.  Currently, he feels well without significant abdominal pain.  Denies any unintended weight loss.  No dysphagia.    Patient Active Problem List   Diagnosis     Atypical nevi     Traylor's esophagus     Benign neoplasm of rectum     Cervical disc herniation     Erectile dysfunction     Hyperlipidemia     Impaired fasting glucose     Lumbar arthropathy     Moderate obesity     Pronation of feet     Vitamin D deficiency     Prediabetes     Chronic right shoulder pain     Status post cervical discectomy     Routine general medical examination at a health care facility       Past Medical History:   Diagnosis Date     Arthritis 9/2000     High cholesterol        Past Surgical History:   Procedure Laterality Date     CERVICAL ARTHOPLASTY/ ARTIFIC DISC, SINGLE       COLONOSCOPY N/A 04/07/2023    Procedure: COLONOSCOPY, FLEXIBLE, WITH LESION REMOVAL USING SNARE;  Surgeon: Shankar Acosta DO;  Location: [facility]     ESOPHAGOSCOPY, GASTROSCOPY, " DUODENOSCOPY (EGD), COMBINED N/A 04/07/2023    Procedure: Esophagoscopy, gastroscopy, duodenoscopy (EGD), BIOPSY;  Surgeon: Shankar Acosta DO;  Location: [facility]     HEAD & NECK SURGERY  6/20    Cervical disk replacement     ORTHOPEDIC SURGERY  2/2010    Plantars fascitis     plantar Fachitiis surgery Left        Family History   Problem Relation Age of Onset     Brain Tumor Mother      Other Cancer Mother         Brain cancer     Hyperlipidemia Father      Other - See Comments Sister         iron lung     No Known Problems Maternal Grandfather      No Known Problems Paternal Grandmother      Alcoholism Sister      No Known Problems Paternal Grandfather      Diabetes Brother      Obesity Brother        Social History     Tobacco Use     Smoking status: Former     Types: Cigarettes     Smokeless tobacco: Never   Substance Use Topics     Alcohol use: Not Currently        History   Drug Use Unknown       Current Outpatient Medications   Medication Sig Dispense Refill     ondansetron (ZOFRAN ODT) 4 MG ODT tab Take 1 tablet (4 mg) by mouth every 8 hours as needed for nausea. 10 tablet 0     pantoprazole (PROTONIX) 40 MG EC tablet Take 1 tablet (40 mg) by mouth daily for 30 doses. 30 tablet 0       Allergies   Allergen Reactions     Cymbalta [Duloxetine Hcl]      Felt like he had covid       Review of Systems:   10 point ROS negative other than what was listed in HPI    Physical Exam:  /71 (BP Location: Right arm, Patient Position: Chair, Cuff Size: Adult Large)   Pulse 65   Temp 97.8  F (36.6  C) (Tympanic)   Wt 80.7 kg (178 lb)   BMI 25.74 kg/m      Constitutional- No acute distress, well nourished, non-toxic  Eyes: Anicteric, no injection.  PERRL  ENT:  Normocephalic, atraumatic, Nose midline, moist mucus membranes  Neck - supple, no LAD  Respiratory- Nonlabored breathing on room air  Cardiovascular - Extremities warm and well perfused.  Abdomen - Soft, non-tender, no hepatosplenomegaly, no palpable  masses  Groins - 2+ pulses bilaterally and no LAD, no masses  Rectal - good tone, no masses, guaiac negative  Neuro - No focal neuro deficits, Alert and oriented x 3  Psych: Appropriate mood and affect  Musculoskeletal: Normal gait, symmetric strength.  FROM upper and lower extremities.  Skin: Warm, Dry    CBC RESULTS:   Recent Labs   Lab Test 10/12/24  0520   WBC 12.4*   RBC 5.13   HGB 15.3   HCT 44.7   MCV 87   MCH 29.8   MCHC 34.2   RDW 12.2        CT abdomen/pelvis 10/12/24  IMPRESSION:      1.  Severe gastritis involving the gastric and antrum with focal ulceration in the posterior wall of the gastric body. No gastric perforation.     2.  Mild prostate enlargement.    EGD 4/7/23  Impression:            - Normal examined duodenum.                          - Multiple gastric polyps. Biopsied.                          - Gastroesophageal flap valve classified as Hill Grade                          III (minimal fold, loose to endoscope, hiatal hernia                          likely).                          - 2 cm hiatal hernia.                          - LA Grade A reflux esophagitis with no bleeding. Rule                          out Traylor's esophagus. Biopsied.                          - Biopsies were taken with a cold forceps for                          Helicobacter pylori testing.     Assessment:  1. John Guzman is a 59 year old male with past medical history of GERD who presents to surgery clinic for evaluation of epigastric pain.  Was previously in the emergency department and a CT scan was ordered which showed severe gastritis in the posterior stomach.  Upper endoscopy from last year showed evidence of inflammation within the stomach as well as a 2 cm hiatal hernia.  At this time, patient states he feels well.  No alarm symptoms of dysphagia or unintended weight loss, no evidence of GI bleed.    Given the previously seen abnormalities at his last upper endoscopy, as well as the concern for  possible ulceration of the stomach on his recent CT scan, I recommend proceeding with a diagnostic upper endoscopy to evaluate for any possible gastritis or gastric ulcers.  We discussed the risks and benefits of the procedure, including risk of bleeding and possible iatrogenic injury to the GI tract.  Patient expressed understanding and is amenable to the proposed plan.    Plan:   1. Schedule for elective upper endoscopy to evaluate for gastritis or peptic ulcer disease        Clive Jain MD on 10/14/2024 at 1:49 PM      Again, thank you for allowing me to participate in the care of your patient.        Sincerely,        Clive Jain MD

## 2024-10-14 NOTE — PROGRESS NOTES
"Surgical Consultation/History and Physical  [facility] Surgery    John is seen in consultation for epigastric pain, at the request of BLAZE Hills CNP.    Chief Complaint: Epigastric pain    History of Present Illness: John Guzman is a 59 year old male presents to surgery clinic for evaluation after he was previously seen in the emergency department for an episode of severe epigastric pain.  This was associated with nausea and a \"sharp stabbing\" sensation in his epigastrium that did not radiate to the right or left.  No associated vomiting, no changes in bowel habits, no noticeable blood in his stool.  He states that something similar happened to him about 10 years ago but this resolved just with medication.  Currently, he feels well without significant abdominal pain.  Denies any unintended weight loss.  No dysphagia.    Patient Active Problem List   Diagnosis    Atypical nevi    Traylor's esophagus    Benign neoplasm of rectum    Cervical disc herniation    Erectile dysfunction    Hyperlipidemia    Impaired fasting glucose    Lumbar arthropathy    Moderate obesity    Pronation of feet    Vitamin D deficiency    Prediabetes    Chronic right shoulder pain    Status post cervical discectomy    Routine general medical examination at a health care facility       Past Medical History:   Diagnosis Date    Arthritis 9/2000    High cholesterol        Past Surgical History:   Procedure Laterality Date    CERVICAL ARTHOPLASTY/ ARTIFIC DISC, SINGLE      COLONOSCOPY N/A 04/07/2023    Procedure: COLONOSCOPY, FLEXIBLE, WITH LESION REMOVAL USING SNARE;  Surgeon: Shankar Acosta DO;  Location: [facility]    ESOPHAGOSCOPY, GASTROSCOPY, DUODENOSCOPY (EGD), COMBINED N/A 04/07/2023    Procedure: Esophagoscopy, gastroscopy, duodenoscopy (EGD), BIOPSY;  Surgeon: Shankar Acosta DO;  Location: [facility]    HEAD & NECK SURGERY  6/20    Cervical disk replacement    ORTHOPEDIC SURGERY  2/2010    " Plantars fascitis    plantar Fachitiis surgery Left        Family History   Problem Relation Age of Onset    Brain Tumor Mother     Other Cancer Mother         Brain cancer    Hyperlipidemia Father     Other - See Comments Sister         iron lung    No Known Problems Maternal Grandfather     No Known Problems Paternal Grandmother     Alcoholism Sister     No Known Problems Paternal Grandfather     Diabetes Brother     Obesity Brother        Social History     Tobacco Use    Smoking status: Former     Types: Cigarettes    Smokeless tobacco: Never   Substance Use Topics    Alcohol use: Not Currently        History   Drug Use Unknown       Current Outpatient Medications   Medication Sig Dispense Refill    ondansetron (ZOFRAN ODT) 4 MG ODT tab Take 1 tablet (4 mg) by mouth every 8 hours as needed for nausea. 10 tablet 0    pantoprazole (PROTONIX) 40 MG EC tablet Take 1 tablet (40 mg) by mouth daily for 30 doses. 30 tablet 0       Allergies   Allergen Reactions    Cymbalta [Duloxetine Hcl]      Felt like he had covid       Review of Systems:   10 point ROS negative other than what was listed in HPI    Physical Exam:  /71 (BP Location: Right arm, Patient Position: Chair, Cuff Size: Adult Large)   Pulse 65   Temp 97.8  F (36.6  C) (Tympanic)   Wt 80.7 kg (178 lb)   BMI 25.74 kg/m      Constitutional- No acute distress, well nourished, non-toxic  Eyes: Anicteric, no injection.  PERRL  ENT:  Normocephalic, atraumatic, Nose midline, moist mucus membranes  Neck - supple, no LAD  Respiratory- Nonlabored breathing on room air  Cardiovascular - Extremities warm and well perfused.  Abdomen - Soft, non-tender, no hepatosplenomegaly, no palpable masses  Groins - 2+ pulses bilaterally and no LAD, no masses  Rectal - good tone, no masses, guaiac negative  Neuro - No focal neuro deficits, Alert and oriented x 3  Psych: Appropriate mood and affect  Musculoskeletal: Normal gait, symmetric strength.  FROM upper and lower  extremities.  Skin: Warm, Dry    CBC RESULTS:   Recent Labs   Lab Test 10/12/24  0520   WBC 12.4*   RBC 5.13   HGB 15.3   HCT 44.7   MCV 87   MCH 29.8   MCHC 34.2   RDW 12.2        CT abdomen/pelvis 10/12/24  IMPRESSION:      1.  Severe gastritis involving the gastric and antrum with focal ulceration in the posterior wall of the gastric body. No gastric perforation.     2.  Mild prostate enlargement.    EGD 4/7/23  Impression:            - Normal examined duodenum.                          - Multiple gastric polyps. Biopsied.                          - Gastroesophageal flap valve classified as Hill Grade                          III (minimal fold, loose to endoscope, hiatal hernia                          likely).                          - 2 cm hiatal hernia.                          - LA Grade A reflux esophagitis with no bleeding. Rule                          out Traylor's esophagus. Biopsied.                          - Biopsies were taken with a cold forceps for                          Helicobacter pylori testing.     Assessment:  1. John Guzman is a 59 year old male with past medical history of GERD who presents to surgery clinic for evaluation of epigastric pain.  Was previously in the emergency department and a CT scan was ordered which showed severe gastritis in the posterior stomach.  Upper endoscopy from last year showed evidence of inflammation within the stomach as well as a 2 cm hiatal hernia.  At this time, patient states he feels well.  No alarm symptoms of dysphagia or unintended weight loss, no evidence of GI bleed.    Given the previously seen abnormalities at his last upper endoscopy, as well as the concern for possible ulceration of the stomach on his recent CT scan, I recommend proceeding with a diagnostic upper endoscopy to evaluate for any possible gastritis or gastric ulcers.  We discussed the risks and benefits of the procedure, including risk of bleeding and possible iatrogenic  injury to the GI tract.  Patient expressed understanding and is amenable to the proposed plan.    Plan:   1. Schedule for elective upper endoscopy to evaluate for gastritis or peptic ulcer disease        Clive Jain MD on 10/14/2024 at 1:49 PM

## 2024-10-14 NOTE — NURSING NOTE
"Initial /71 (BP Location: Right arm, Patient Position: Chair, Cuff Size: Adult Large)   Pulse 65   Temp 97.8  F (36.6  C) (Tympanic)   Wt 80.7 kg (178 lb)   BMI 25.74 kg/m   Estimated body mass index is 25.74 kg/m  as calculated from the following:    Height as of 9/30/24: 1.771 m (5' 9.72\").    Weight as of this encounter: 80.7 kg (178 lb). .  Ayesha Perez LPN    "

## 2024-10-16 ENCOUNTER — MYC MEDICAL ADVICE (OUTPATIENT)
Dept: SURGERY | Facility: CLINIC | Age: 59
End: 2024-10-16
Payer: COMMERCIAL

## 2024-10-16 DIAGNOSIS — R10.13 EPIGASTRIC PAIN: Primary | ICD-10-CM

## 2024-10-18 DIAGNOSIS — R10.13 EPIGASTRIC PAIN: Primary | ICD-10-CM

## 2024-10-23 ENCOUNTER — MYC MEDICAL ADVICE (OUTPATIENT)
Dept: FAMILY MEDICINE | Facility: CLINIC | Age: 59
End: 2024-10-23
Payer: COMMERCIAL

## 2024-10-23 DIAGNOSIS — R10.9 ABDOMINAL PAIN, UNSPECIFIED ABDOMINAL LOCATION: Primary | ICD-10-CM

## 2024-10-23 NOTE — TELEPHONE ENCOUNTER
The phone number given was incorrect     Please contact procedure scheduling at [phone]    Thank you  
1. labs were normal today  2. patient was sent by psych and stable for discharge back to group home  3. follow up with your psychiatrist  4. return promptly in c/o worsening symptoms

## 2024-10-24 ENCOUNTER — ANESTHESIA EVENT (OUTPATIENT)
Dept: GASTROENTEROLOGY | Facility: CLINIC | Age: 59
End: 2024-10-24
Payer: COMMERCIAL

## 2024-10-24 NOTE — TELEPHONE ENCOUNTER
Patient with continued upper abdominal pain. Ordered Upper Endoscopy.     Thank you,    BLAZE Hills CNP

## 2024-10-24 NOTE — ANESTHESIA PREPROCEDURE EVALUATION
Anesthesia Pre-Procedure Evaluation    Patient: John Guzman   MRN: [id number] : 1965        Procedure : Procedure(s):  ESOPHAGOGASTRODUODENOSCOPY, WITH BIOPSY          Past Medical History:   Diagnosis Date    Arthritis 2000    High cholesterol       Past Surgical History:   Procedure Laterality Date    CERVICAL ARTHOPLASTY/ ARTIFIC DISC, SINGLE      COLONOSCOPY N/A 2023    Procedure: COLONOSCOPY, FLEXIBLE, WITH LESION REMOVAL USING SNARE;  Surgeon: Shankar Acosta DO;  Location: [facility]    ESOPHAGOSCOPY, GASTROSCOPY, DUODENOSCOPY (EGD), COMBINED N/A 2023    Procedure: Esophagoscopy, gastroscopy, duodenoscopy (EGD), BIOPSY;  Surgeon: Shankar Acosta DO;  Location: [facility]    HEAD & NECK SURGERY      Cervical disk replacement    ORTHOPEDIC SURGERY  2010    Plantars fascitis    plantar Fachitiis surgery Left       Allergies   Allergen Reactions    Cymbalta [Duloxetine Hcl]      Felt like he had covid      Social History     Tobacco Use    Smoking status: Former     Types: Cigarettes    Smokeless tobacco: Never   Substance Use Topics    Alcohol use: Not Currently      Wt Readings from Last 1 Encounters:   10/14/24 80.7 kg (178 lb)        Anesthesia Evaluation   Pt has had prior anesthetic. Type: General and MAC.        ROS/MED HX  ENT/Pulmonary:       Neurologic:       Cardiovascular:     (+) Dyslipidemia - -   -  - -                                      METS/Exercise Tolerance: >4 METS    Hematologic:       Musculoskeletal:       GI/Hepatic:       Renal/Genitourinary:       Endo:     (+)               Obesity,       Psychiatric/Substance Use:       Infectious Disease:       Malignancy:       Other:            Physical Exam    Airway  airway exam normal      Mallampati: II   TM distance: > 3 FB   Neck ROM: full   Mouth opening: > 3 cm    Respiratory Devices and Support         Dental  no notable dental history     (+) Minor Abnormalities - some fillings, tiny  "chips      Cardiovascular   cardiovascular exam normal          Pulmonary   pulmonary exam normal                OUTSIDE LABS:  CBC:   Lab Results   Component Value Date    WBC 12.4 (H) 10/12/2024    WBC 5.9 11/22/2023    HGB 15.3 10/12/2024    HGB 14.7 11/22/2023    HCT 44.7 10/12/2024    HCT 44.4 11/22/2023     10/12/2024     11/22/2023     BMP:   Lab Results   Component Value Date     10/12/2024     09/30/2024    POTASSIUM 5.4 (H) 10/12/2024    POTASSIUM 4.1 09/30/2024    CHLORIDE 100 10/12/2024    CHLORIDE 102 09/30/2024    CO2 23 10/12/2024    CO2 28 09/30/2024    BUN 14.1 10/12/2024    BUN 6.8 09/30/2024    CR 0.95 10/12/2024    CR 0.93 09/30/2024     (H) 10/12/2024    GLC 97 09/30/2024     COAGS:   Lab Results   Component Value Date    INR 1.0 11/22/2023     POC: No results found for: \"BGM\", \"HCG\", \"HCGS\"  HEPATIC:   Lab Results   Component Value Date    ALBUMIN 4.1 10/12/2024    PROTTOTAL 7.2 10/12/2024    ALT 20 10/12/2024    AST 35 10/12/2024    ALKPHOS 74 10/12/2024    BILITOTAL 0.5 10/12/2024     OTHER:   Lab Results   Component Value Date    A1C 5.8 (H) 03/29/2023    GAYATHRI 9.4 10/12/2024    LIPASE 61 (H) 10/12/2024    TSH 2.63 09/12/2023       Anesthesia Plan    ASA Status:  2    NPO Status:  NPO Appropriate    Anesthesia Type: General.   Induction: Propofol, Intravenous.   Maintenance: TIVA.        Consents    Anesthesia Plan(s) and associated risks, benefits, and realistic alternatives discussed. Questions answered and patient/representative(s) expressed understanding.     - Discussed: Risks, Benefits and Alternatives for BOTH SEDATION and the PROCEDURE were discussed     - Discussed with:  Patient            Postoperative Care    Pain management: Multi-modal analgesia, IV analgesics, Oral pain medications.   PONV prophylaxis: Ondansetron (or other 5HT-3), Dexamethasone or Solumedrol, Background Propofol Infusion     Comments:               BLAZE Sanchez " "CRNA    I have reviewed the pertinent notes and labs in the chart from the past 30 days and (re)examined the patient.  Any updates or changes from those notes are reflected in this note.                       # Overweight: Estimated body mass index is 25.74 kg/m  as calculated from the following:    Height as of 9/30/24: 1.771 m (5' 9.72\").    Weight as of 10/14/24: 80.7 kg (178 lb).             "

## 2024-10-25 ENCOUNTER — ANESTHESIA (OUTPATIENT)
Dept: GASTROENTEROLOGY | Facility: CLINIC | Age: 59
End: 2024-10-25
Payer: COMMERCIAL

## 2024-10-25 ENCOUNTER — HOSPITAL ENCOUNTER (OUTPATIENT)
Facility: CLINIC | Age: 59
Discharge: HOME | End: 2024-10-25
Attending: SURGERY | Admitting: SURGERY
Payer: COMMERCIAL

## 2024-10-25 VITALS
DIASTOLIC BLOOD PRESSURE: 75 MMHG | TEMPERATURE: 98.1 F | SYSTOLIC BLOOD PRESSURE: 108 MMHG | OXYGEN SATURATION: 98 % | RESPIRATION RATE: 16 BRPM | HEART RATE: 58 BPM

## 2024-10-25 LAB — UPPER GI ENDOSCOPY: NORMAL

## 2024-10-25 PROCEDURE — 88305 TISSUE EXAM BY PATHOLOGIST: CPT | Mod: TC | Performed by: SURGERY

## 2024-10-25 PROCEDURE — 250N000011 HC RX IP 250 OP 636: Performed by: NURSE ANESTHETIST, CERTIFIED REGISTERED

## 2024-10-25 PROCEDURE — 88305 TISSUE EXAM BY PATHOLOGIST: CPT | Mod: 26 | Performed by: PATHOLOGY

## 2024-10-25 PROCEDURE — 43239 EGD BIOPSY SINGLE/MULTIPLE: CPT | Performed by: SURGERY

## 2024-10-25 PROCEDURE — 250N000009 HC RX 250: Performed by: NURSE ANESTHETIST, CERTIFIED REGISTERED

## 2024-10-25 PROCEDURE — 250N000009 HC RX 250: Performed by: SURGERY

## 2024-10-25 PROCEDURE — 88342 IMHCHEM/IMCYTCHM 1ST ANTB: CPT | Mod: 26 | Performed by: PATHOLOGY

## 2024-10-25 PROCEDURE — 370N000017 HC ANESTHESIA TECHNICAL FEE, PER MIN: Performed by: SURGERY

## 2024-10-25 RX ORDER — PROPOFOL 10 MG/ML
INJECTION, EMULSION INTRAVENOUS PRN
Status: DISCONTINUED | OUTPATIENT
Start: 2024-10-25 | End: 2024-10-25

## 2024-10-25 RX ORDER — DEXAMETHASONE SODIUM PHOSPHATE 4 MG/ML
4 INJECTION, SOLUTION INTRA-ARTICULAR; INTRALESIONAL; INTRAMUSCULAR; INTRAVENOUS; SOFT TISSUE
Status: DISCONTINUED | OUTPATIENT
Start: 2024-10-25 | End: 2024-10-25 | Stop reason: HOSPADM

## 2024-10-25 RX ORDER — LIDOCAINE 40 MG/G
CREAM TOPICAL
Status: DISCONTINUED | OUTPATIENT
Start: 2024-10-25 | End: 2024-10-25 | Stop reason: HOSPADM

## 2024-10-25 RX ORDER — SODIUM CHLORIDE, SODIUM LACTATE, POTASSIUM CHLORIDE, CALCIUM CHLORIDE 600; 310; 30; 20 MG/100ML; MG/100ML; MG/100ML; MG/100ML
INJECTION, SOLUTION INTRAVENOUS CONTINUOUS
Status: DISCONTINUED | OUTPATIENT
Start: 2024-10-25 | End: 2024-10-25 | Stop reason: HOSPADM

## 2024-10-25 RX ORDER — LIDOCAINE HYDROCHLORIDE 20 MG/ML
INJECTION, SOLUTION INFILTRATION; PERINEURAL PRN
Status: DISCONTINUED | OUTPATIENT
Start: 2024-10-25 | End: 2024-10-25

## 2024-10-25 RX ADMIN — PROPOFOL 200 MG: 10 INJECTION, EMULSION INTRAVENOUS at 07:51

## 2024-10-25 RX ADMIN — LIDOCAINE HYDROCHLORIDE 0.1 ML: 10 INJECTION, SOLUTION EPIDURAL; INFILTRATION; INTRACAUDAL; PERINEURAL at 07:16

## 2024-10-25 RX ADMIN — LIDOCAINE HYDROCHLORIDE 120 MG: 20 INJECTION, SOLUTION INFILTRATION; PERINEURAL at 07:51

## 2024-10-25 ASSESSMENT — ACTIVITIES OF DAILY LIVING (ADL)
ADLS_ACUITY_SCORE: 0
ADLS_ACUITY_SCORE: 0

## 2024-10-25 NOTE — ANESTHESIA CARE TRANSFER NOTE
Patient: John Guzman    Procedure: Procedure(s):  ESOPHAGOGASTRODUODENOSCOPY, WITH BIOPSY       Diagnosis: Epigastric pain [R10.13]  Diagnosis Additional Information: No value filed.    Anesthesia Type:   General     Note:    Oropharynx: oropharynx clear of all foreign objects and spontaneously breathing  Level of Consciousness: awake  Oxygen Supplementation: room air    Independent Airway: airway patency satisfactory and stable  Dentition: dentition unchanged  Vital Signs Stable: post-procedure vital signs reviewed and stable  Report to RN Given: handoff report given  Patient transferred to: Phase II    Handoff Report: Identifed the Patient, Identified the Reponsible Provider, Reviewed the pertinent medical history, Discussed the surgical course, Reviewed Intra-OP anesthesia mangement and issues during anesthesia, Set expectations for post-procedure period and Allowed opportunity for questions and acknowledgement of understanding      Vitals:  Vitals Value Taken Time   BP     Temp     Pulse     Resp     SpO2         Electronically Signed By: BLAZE Vinson CRNA  October 25, 2024  8:02 AM

## 2024-10-25 NOTE — INTERVAL H&P NOTE
"I have reviewed the surgical (or preoperative) H&P that is linked to this encounter, and examined the patient. There are no significant changes    Hx of Suazo's esophagus; EGD 4/2023 (final path - no suazo's); protonix 40mg; no blood thinner;     Clinical Conditions Present on Arrival:  Clinically Significant Risk Factors Present on Admission        # Hyperkalemia: Highest K = 5.4 mmol/L in last 30 days, will monitor as appropriate                # Overweight: Estimated body mass index is 26.29 kg/m  (pended) as calculated from the following:    Height as of this encounter: (P) 1.753 m (5' 9\").    Weight as of this encounter: (P) 80.7 kg (178 lb).       "

## 2024-10-25 NOTE — ANESTHESIA POSTPROCEDURE EVALUATION
Patient: John Guzman    Procedure: Procedure(s):  ESOPHAGOGASTRODUODENOSCOPY, WITH BIOPSY       Anesthesia Type:  General    Note:  Disposition: Outpatient   Postop Pain Control: Uneventful            Sign Out: Well controlled pain   PONV: No   Neuro/Psych: Uneventful            Sign Out: Acceptable/Baseline neuro status   Airway/Respiratory: Uneventful            Sign Out: Acceptable/Baseline resp. status   CV/Hemodynamics: Uneventful            Sign Out: Acceptable CV status; No obvious hypovolemia; No obvious fluid overload   Other NRE: NONE   DID A NON-ROUTINE EVENT OCCUR? No           Last vitals:  Vitals:    10/25/24 0700   BP: (P) 112/70   Pulse: (P) 54   Resp: (P) 18   Temp: (P) 36.5  C (97.7  F)   SpO2: (P) 98%       Electronically Signed By: BLAZE Vinson CRNA  October 25, 2024  8:03 AM

## 2024-10-25 NOTE — LETTER
John Guzman  [address] [facility] [address]  November 5, 2024    Dear John,   This letter is to inform you of the results of your pathology report on your upper endoscopy (EGD).    Your pathology report was:  Final Diagnosis   A(1).  Duodenum, biopsy:  -Small intestinal mucosa with active chronic peptic duodenitis/gastric heterotopia.  -Negative for H. Pylori organisms on immunohistochemical stains.  -Negative for dysplasia or malignancy.  B(2).Stomach, antrum, biopsy  -Antral type gastric mucosa with active chronic gastritis (see comment).  -Negative for H. Pylori organisms by immunohistochemical stains.  -POSITIVE for intestinal metaplasia.  -Negative for dysplasia or malignancy.  C(3).  Esophagus, distal, biopsy:  -Gastric columnar mucosa with chronic inflammation and reactive epithelial changes.  -Negative for intestinal metaplasia.  -Negative for dysplasia or malignancy.     Showed findings consistent with reflux (heartburn).  Showed findings consistent with a mildly inflamed stomach.   Gastric intestinal metaplasia (GIM) is an intermediate precancerous gastric lesion.  We are in a low gastric cancer incidence regions ([place]), GIM prevalence is approximately 5 percent.  Prevalence of GIM increases with age and is higher in areas with high prevalence of Helicobacter pylori (H. pylori), in men, and in current smokers.  It is recommended that you get upper endoscopy every 2-3 years with specific biopsies of the stomach for surveillance.  I recommend that you see a gastroenterologist for your next upper endoscopy with specific biopsies of the stomach.     Please follow up with your primary care doctor and a gastroenterology referral may be warranted.    If you have any questions or concerns please do not hesitate to call my office at [phone].  Sincerely,     [facility] DO Cross FACOS  [place] [facility]

## 2024-10-29 LAB
PATH REPORT.COMMENTS IMP SPEC: NORMAL
PATH REPORT.FINAL DX SPEC: NORMAL
PATH REPORT.GROSS SPEC: NORMAL
PATH REPORT.MICROSCOPIC SPEC OTHER STN: NORMAL
PATH REPORT.MICROSCOPIC SPEC OTHER STN: NORMAL
PATH REPORT.RELEVANT HX SPEC: NORMAL
PHOTO IMAGE: NORMAL

## 2024-11-04 DIAGNOSIS — K21.00 GASTROESOPHAGEAL REFLUX DISEASE WITH ESOPHAGITIS, UNSPECIFIED WHETHER HEMORRHAGE: Primary | ICD-10-CM

## 2024-11-04 RX ORDER — PANTOPRAZOLE SODIUM 40 MG/1
40 TABLET, DELAYED RELEASE ORAL DAILY
Qty: 30 TABLET | Refills: 0 | Status: SHIPPED | OUTPATIENT
Start: 2024-11-04

## 2024-11-04 NOTE — TELEPHONE ENCOUNTER
Pending Prescriptions:                       Disp   Refills    pantoprazole (PROTONIX) 40 MG EC tablet   30 tab*0            Sig: Take 1 tablet (40 mg) by mouth daily.

## 2024-11-04 NOTE — TELEPHONE ENCOUNTER
Requested Prescriptions   Pending Prescriptions Disp Refills    pantoprazole (PROTONIX) 40 MG EC tablet 30 tablet 0     Sig: Take 1 tablet (40 mg) by mouth daily.       PPI Protocol Failed - 11/4/2024 12:05 PM        Failed - Medication indicated for associated diagnosis     The medication is prescribed for one or more of the following conditions:     Erosive esophagitis    Gastritis   Gastric hypersecretion   Gastric ulcer   Gastroesophageal reflux disease   Helicobacter pylori gastrointestinal tract infection   Ulcer of duodenum   Drug-induced peptic ulcer   Esophageal stricture   Gastrointestinal hemorrhage   Indigestion   Stress ulcer   Zollinger-Huitron syndrome   Traylor s esophagus   Laryngopharyngeal reflux   Epigastric Pain   Morbid Obesity   Cough   History of Peptic Ulcer   Esophageal Atresia, Stenosis and Fistula          Failed - Recent (12 mo) or future (90 days) visit within the authorizing provider's specialty     The patient must have completed an in-person or virtual visit within the past 12 months or has a future visit scheduled within the next 90 days with the authorizing provider s specialty.  Urgent care and e-visits do not quality as an office visit for this protocol.          Passed - Medication is active on med list        Passed - Patient is age 18 or older

## 2024-11-07 ENCOUNTER — APPOINTMENT (OUTPATIENT)
Dept: CT IMAGING | Facility: CLINIC | Age: 59
End: 2024-11-07
Payer: COMMERCIAL

## 2024-11-07 ENCOUNTER — HOSPITAL ENCOUNTER (EMERGENCY)
Facility: CLINIC | Age: 59
Discharge: HOME | End: 2024-11-07
Payer: COMMERCIAL

## 2024-11-07 VITALS
WEIGHT: 180 LBS | SYSTOLIC BLOOD PRESSURE: 133 MMHG | RESPIRATION RATE: 18 BRPM | DIASTOLIC BLOOD PRESSURE: 76 MMHG | BODY MASS INDEX: 26.66 KG/M2 | TEMPERATURE: 98.4 F | HEART RATE: 72 BPM | OXYGEN SATURATION: 97 % | HEIGHT: 69 IN

## 2024-11-07 DIAGNOSIS — N30.01 ACUTE CYSTITIS WITH HEMATURIA: ICD-10-CM

## 2024-11-07 LAB
ALBUMIN SERPL BCG-MCNC: 4 G/DL (ref 3.5–5.2)
ALBUMIN UR-MCNC: >499 MG/DL
ALP SERPL-CCNC: 66 U/L (ref 40–150)
ALT SERPL W P-5'-P-CCNC: 14 U/L (ref 0–70)
ANION GAP SERPL CALCULATED.3IONS-SCNC: 10 MMOL/L (ref 7–15)
APPEARANCE UR: ABNORMAL
AST SERPL W P-5'-P-CCNC: 18 U/L (ref 0–45)
BASOPHILS # BLD AUTO: 0 10E3/UL (ref 0–0.2)
BASOPHILS NFR BLD AUTO: 0 %
BILIRUB SERPL-MCNC: 0.3 MG/DL
BILIRUB UR QL STRIP: NEGATIVE
BUN SERPL-MCNC: 12.3 MG/DL (ref 6–20)
CALCIUM SERPL-MCNC: 9.1 MG/DL (ref 8.8–10.4)
CHLORIDE SERPL-SCNC: 102 MMOL/L (ref 98–107)
COLOR UR AUTO: YELLOW
CREAT SERPL-MCNC: 0.87 MG/DL (ref 0.67–1.17)
EGFRCR SERPLBLD CKD-EPI 2021: >90 ML/MIN/1.73M2
EOSINOPHIL # BLD AUTO: 0.1 10E3/UL (ref 0–0.7)
EOSINOPHIL NFR BLD AUTO: 1 %
ERYTHROCYTE [DISTWIDTH] IN BLOOD BY AUTOMATED COUNT: 12.3 % (ref 10–15)
GLUCOSE SERPL-MCNC: 147 MG/DL (ref 70–99)
GLUCOSE UR STRIP-MCNC: NEGATIVE MG/DL
HCO3 SERPL-SCNC: 25 MMOL/L (ref 22–29)
HCT VFR BLD AUTO: 39.1 % (ref 40–53)
HGB BLD-MCNC: 13.6 G/DL (ref 13.3–17.7)
HGB UR QL STRIP: ABNORMAL
IMM GRANULOCYTES # BLD: 0 10E3/UL
IMM GRANULOCYTES NFR BLD: 0 %
KETONES UR STRIP-MCNC: NEGATIVE MG/DL
LEUKOCYTE ESTERASE UR QL STRIP: ABNORMAL
LYMPHOCYTES # BLD AUTO: 1.2 10E3/UL (ref 0.8–5.3)
LYMPHOCYTES NFR BLD AUTO: 9 %
MCH RBC QN AUTO: 30.2 PG (ref 26.5–33)
MCHC RBC AUTO-ENTMCNC: 34.8 G/DL (ref 31.5–36.5)
MCV RBC AUTO: 87 FL (ref 78–100)
MONOCYTES # BLD AUTO: 1 10E3/UL (ref 0–1.3)
MONOCYTES NFR BLD AUTO: 8 %
NEUTROPHILS # BLD AUTO: 10.2 10E3/UL (ref 1.6–8.3)
NEUTROPHILS NFR BLD AUTO: 82 %
NITRATE UR QL: NEGATIVE
NRBC # BLD AUTO: 0 10E3/UL
NRBC BLD AUTO-RTO: 0 /100
PH UR STRIP: 8 [PH] (ref 5–7)
PLATELET # BLD AUTO: 172 10E3/UL (ref 150–450)
POTASSIUM SERPL-SCNC: 3.9 MMOL/L (ref 3.4–5.3)
PROT SERPL-MCNC: 6.4 G/DL (ref 6.4–8.3)
RBC # BLD AUTO: 4.51 10E6/UL (ref 4.4–5.9)
RBC URINE: >182 /HPF
SODIUM SERPL-SCNC: 137 MMOL/L (ref 135–145)
SP GR UR STRIP: 1.02 (ref 1–1.03)
UROBILINOGEN UR STRIP-MCNC: NORMAL MG/DL
WBC # BLD AUTO: 12.5 10E3/UL (ref 4–11)
WBC URINE: >182 /HPF

## 2024-11-07 PROCEDURE — 96375 TX/PRO/DX INJ NEW DRUG ADDON: CPT

## 2024-11-07 PROCEDURE — 81003 URINALYSIS AUTO W/O SCOPE: CPT | Performed by: EMERGENCY MEDICINE

## 2024-11-07 PROCEDURE — 81001 URINALYSIS AUTO W/SCOPE: CPT

## 2024-11-07 PROCEDURE — 96366 THER/PROPH/DIAG IV INF ADDON: CPT

## 2024-11-07 PROCEDURE — 96365 THER/PROPH/DIAG IV INF INIT: CPT | Mod: 59

## 2024-11-07 PROCEDURE — 74177 CT ABD & PELVIS W/CONTRAST: CPT

## 2024-11-07 PROCEDURE — 85004 AUTOMATED DIFF WBC COUNT: CPT

## 2024-11-07 PROCEDURE — 82947 ASSAY GLUCOSE BLOOD QUANT: CPT

## 2024-11-07 PROCEDURE — 250N000009 HC RX 250

## 2024-11-07 PROCEDURE — 36415 COLL VENOUS BLD VENIPUNCTURE: CPT

## 2024-11-07 PROCEDURE — 250N000011 HC RX IP 250 OP 636: Performed by: EMERGENCY MEDICINE

## 2024-11-07 PROCEDURE — 250N000011 HC RX IP 250 OP 636

## 2024-11-07 PROCEDURE — 87086 URINE CULTURE/COLONY COUNT: CPT

## 2024-11-07 PROCEDURE — 99284 EMERGENCY DEPT VISIT MOD MDM: CPT

## 2024-11-07 PROCEDURE — 84155 ASSAY OF PROTEIN SERUM: CPT

## 2024-11-07 PROCEDURE — 99285 EMERGENCY DEPT VISIT HI MDM: CPT | Mod: 25

## 2024-11-07 RX ORDER — LORAZEPAM 1 MG/1
2 TABLET ORAL ONCE
Status: DISCONTINUED | OUTPATIENT
Start: 2024-11-07 | End: 2024-11-07

## 2024-11-07 RX ORDER — IOPAMIDOL 755 MG/ML
89 INJECTION, SOLUTION INTRAVASCULAR ONCE
Status: COMPLETED | OUTPATIENT
Start: 2024-11-07 | End: 2024-11-07

## 2024-11-07 RX ORDER — CEFTRIAXONE 2 G/1
2 INJECTION, POWDER, FOR SOLUTION INTRAMUSCULAR; INTRAVENOUS ONCE
Status: COMPLETED | OUTPATIENT
Start: 2024-11-07 | End: 2024-11-07

## 2024-11-07 RX ORDER — ONDANSETRON 2 MG/ML
4 INJECTION INTRAMUSCULAR; INTRAVENOUS ONCE
Status: COMPLETED | OUTPATIENT
Start: 2024-11-07 | End: 2024-11-07

## 2024-11-07 RX ORDER — CIPROFLOXACIN 500 MG/1
500 TABLET, FILM COATED ORAL 2 TIMES DAILY
Qty: 28 TABLET | Refills: 0 | Status: SHIPPED | OUTPATIENT
Start: 2024-11-07 | End: 2024-11-21

## 2024-11-07 RX ORDER — KETOROLAC TROMETHAMINE 15 MG/ML
15 INJECTION, SOLUTION INTRAMUSCULAR; INTRAVENOUS ONCE
Status: COMPLETED | OUTPATIENT
Start: 2024-11-07 | End: 2024-11-07

## 2024-11-07 RX ADMIN — SODIUM CHLORIDE 62 ML: 9 INJECTION, SOLUTION INTRAVENOUS at 20:11

## 2024-11-07 RX ADMIN — KETOROLAC TROMETHAMINE 15 MG: 15 INJECTION, SOLUTION INTRAMUSCULAR; INTRAVENOUS at 19:09

## 2024-11-07 RX ADMIN — CEFTRIAXONE 2 G: 2 INJECTION, POWDER, FOR SOLUTION INTRAMUSCULAR; INTRAVENOUS at 20:18

## 2024-11-07 RX ADMIN — IOPAMIDOL 89 ML: 755 INJECTION, SOLUTION INTRAVENOUS at 20:11

## 2024-11-07 RX ADMIN — ONDANSETRON 4 MG: 2 INJECTION INTRAMUSCULAR; INTRAVENOUS at 19:07

## 2024-11-07 ASSESSMENT — COLUMBIA-SUICIDE SEVERITY RATING SCALE - C-SSRS
1. IN THE PAST MONTH, HAVE YOU WISHED YOU WERE DEAD OR WISHED YOU COULD GO TO SLEEP AND NOT WAKE UP?: NO
2. HAVE YOU ACTUALLY HAD ANY THOUGHTS OF KILLING YOURSELF IN THE PAST MONTH?: NO
6. HAVE YOU EVER DONE ANYTHING, STARTED TO DO ANYTHING, OR PREPARED TO DO ANYTHING TO END YOUR LIFE?: NO

## 2024-11-07 ASSESSMENT — ACTIVITIES OF DAILY LIVING (ADL)
ADLS_ACUITY_SCORE: 0

## 2024-11-07 NOTE — ED TRIAGE NOTES
Since 10:00 today has been having hematuria, bilateral flank pain that wraps around to the front of his abdomen.

## 2024-11-08 ENCOUNTER — MYC MEDICAL ADVICE (OUTPATIENT)
Dept: FAMILY MEDICINE | Facility: CLINIC | Age: 59
End: 2024-11-08
Payer: COMMERCIAL

## 2024-11-08 LAB — BACTERIA UR CULT: NO GROWTH

## 2024-11-08 NOTE — DISCHARGE INSTRUCTIONS
You have a bladder infection. I prescribed an antibiotic for it. Hopefully it will improve with this.    This is very effective, but you need to be careful about tendon damage.  No running, jumping, or heavy exercise while taking it.  If your tendons, especially your Achilles, starts hurting, stop taking it.    Please follow up with your regular provider next week.    If you develop a fever or confusion or this is getting worse, come back.

## 2024-11-08 NOTE — ED NOTES
"     Emergency Department Patient Sign-out       Brief HPI:  This is a 59 year old male signed out to me by Erica Omalley.  See initial ED Provider note for details of the presentation.     -gross hematuria for a day  -abdominal pain and b/l flank pain  -pending CT  -no stone history        Exam:   Patient Vitals for the past 24 hrs:   BP Temp Temp src Pulse Resp SpO2 Height Weight   11/07/24 1616 133/76 98.4  F (36.9  C) Oral 72 18 97 % 1.753 m (5' 9\") 81.6 kg (180 lb)           ED RESULTS:   Results for orders placed or performed during the hospital encounter of 11/07/24 (from the past 24 hours)   UA with Microscopic reflex to Culture     Status: Abnormal    Collection Time: 11/07/24  4:21 PM    Specimen: Urine, Clean Catch   Result Value Ref Range    Color Urine Yellow Colorless, Straw, Light Yellow, Yellow    Appearance Urine Cloudy (A) Clear    Glucose Urine Negative Negative mg/dL    Bilirubin Urine Negative Negative    Ketones Urine Negative Negative mg/dL    Specific Gravity Urine 1.017 1.003 - 1.035    Blood Urine Large (A) Negative    pH Urine 8.0 (H) 5.0 - 7.0    Protein Albumin Urine >499 (A) Negative mg/dL    Urobilinogen Urine Normal Normal, 2.0 mg/dL    Nitrite Urine Negative Negative    Leukocyte Esterase Urine Moderate (A) Negative    RBC Urine >182 (H) <=2 /HPF    WBC Urine >182 (H) <=5 /HPF    Narrative    Urine Culture ordered based on laboratory criteria   CBC with platelets differential     Status: Abnormal    Collection Time: 11/07/24  7:06 PM    Narrative    The following orders were created for panel order CBC with platelets differential.  Procedure                               Abnormality         Status                     ---------                               -----------         ------                     CBC with platelets and d...[[id number]]  Abnormal            Final result                 Please view results for these tests on the individual orders.   Comprehensive metabolic panel    "  Status: Abnormal    Collection Time: 11/07/24  7:06 PM   Result Value Ref Range    Sodium 137 135 - 145 mmol/L    Potassium 3.9 3.4 - 5.3 mmol/L    Carbon Dioxide (CO2) 25 22 - 29 mmol/L    Anion Gap 10 7 - 15 mmol/L    Urea Nitrogen 12.3 6.0 - 20.0 mg/dL    Creatinine 0.87 0.67 - 1.17 mg/dL    GFR Estimate >90 >60 mL/min/1.73m2    Calcium 9.1 8.8 - 10.4 mg/dL    Chloride 102 98 - 107 mmol/L    Glucose 147 (H) 70 - 99 mg/dL    Alkaline Phosphatase 66 40 - 150 U/L    AST 18 0 - 45 U/L    ALT 14 0 - 70 U/L    Protein Total 6.4 6.4 - 8.3 g/dL    Albumin 4.0 3.5 - 5.2 g/dL    Bilirubin Total 0.3 <=1.2 mg/dL   CBC with platelets and differential     Status: Abnormal    Collection Time: 11/07/24  7:06 PM   Result Value Ref Range    WBC Count 12.5 (H) 4.0 - 11.0 10e3/uL    RBC Count 4.51 4.40 - 5.90 10e6/uL    Hemoglobin 13.6 13.3 - 17.7 g/dL    Hematocrit 39.1 (L) 40.0 - 53.0 %    MCV 87 78 - 100 fL    MCH 30.2 26.5 - 33.0 pg    MCHC 34.8 31.5 - 36.5 g/dL    RDW 12.3 10.0 - 15.0 %    Platelet Count 172 150 - 450 10e3/uL    % Neutrophils 82 %    % Lymphocytes 9 %    % Monocytes 8 %    % Eosinophils 1 %    % Basophils 0 %    % Immature Granulocytes 0 %    NRBCs per 100 WBC 0 <1 /100    Absolute Neutrophils 10.2 (H) 1.6 - 8.3 10e3/uL    Absolute Lymphocytes 1.2 0.8 - 5.3 10e3/uL    Absolute Monocytes 1.0 0.0 - 1.3 10e3/uL    Absolute Eosinophils 0.1 0.0 - 0.7 10e3/uL    Absolute Basophils 0.0 0.0 - 0.2 10e3/uL    Absolute Immature Granulocytes 0.0 <=0.4 10e3/uL    Absolute NRBCs 0.0 10e3/uL   CT Abdomen Pelvis w Contrast     Status: None    Collection Time: 11/07/24  8:18 PM    Narrative    EXAM: CT ABDOMEN PELVIS W CONTRAST  LOCATION: [facility]  DATE: 11/7/2024    INDICATION: hematuria, flank pain, urinary urgency. eval for stone or infection.  COMPARISON: 10/12/2024  TECHNIQUE: CT scan of the abdomen and pelvis was performed following injection of IV contrast. Multiplanar reformats were  obtained. Dose reduction techniques were used.  CONTRAST: 89mL Isovue 370    FINDINGS:   LOWER CHEST: Normal.    HEPATOBILIARY: Normal.    PANCREAS: Normal.    SPLEEN: Normal.    ADRENAL GLANDS: Normal.    KIDNEYS/BLADDER: No significant mass, stone, or hydronephrosis. Severe urinary bladder wall thickening, mucosal hyperenhancement, and perivesical fat stranding.    BOWEL: Normal.    LYMPH NODES: Normal.    VASCULATURE: Normal.    PELVIC ORGANS: Normal.    MUSCULOSKELETAL: Mild degenerative changes in the spine.      Impression    IMPRESSION:   Acute cystitis, which could be infectious or inflammatory.         ED MEDICATIONS:   Medications   ketorolac (TORADOL) injection 15 mg (15 mg Intravenous $Given 11/7/24 1909)   ondansetron (ZOFRAN) injection 4 mg (4 mg Intravenous $Given 11/7/24 1907)   iopamidol (ISOVUE-370) solution 89 mL (89 mLs Intravenous $Given 11/7/24 2011)   sodium chloride 0.9 % bag 500mL for CT scan flush use (62 mLs Intravenous $Given 11/7/24 2011)   cefTRIAXone (ROCEPHIN) 2 g vial to attach to  ml bag for ADULTS or NS 50 ml bag for PEDS (0 g Intravenous Stopped 11/7/24 2221)         Impression:    ICD-10-CM    1. Acute cystitis with hematuria  N30.01           Plan:    -rocephin  -follow up scan           2022 - Has very thick bladder on my independent interpretation of the CT. Suspect possible lesion.     2129 - CT read as:    IMPRESSION:   Acute cystitis, which could be infectious or inflammatory.      2200 - rocephin done    2220 - updated patient and wife. Will rx cirpo. Cautioned about tendonopathy. Will follow up with PCP next week. Wants to be discharged. Gave ER precautions at length. Will discharge at his request now. Very appreciative.       MD Sampson Alexis Jacob Francis, MD  11/07/24 2001       Earle Braden MD  11/07/24 2023       Earle Braden MD  11/07/24 2130       Earle Braden MD  11/07/24 2227

## 2024-11-08 NOTE — ED PROVIDER NOTES
History     Chief Complaint   Patient presents with    Hematuria       John Guzman is a 59 year old male with significant pmhx of suazo's esophagus, prediabetes, cervical disc herniation s/p cervical discectomy who presents for evaluation of flank pain and hematuria.  Patient states he developed urinary urgency and frequency this morning that has become progressively worse.  This afternoon he subsequently developed hematuria with both dark/rust colored urine and bright red blood, as well as lower abdominal pain and pain wrapping around into the low back. He denies passage of clots. He has been urinating every 15-30min. He has had some nausea, but denies vomiting. He denies associated fever, sore throat, cough, chest pain, difficulty breathing. He has no hx of kidney stones or UTI. No hx of abdominal surgeries.     Allergies:  Allergies   Allergen Reactions    Cymbalta [Duloxetine Hcl]      Felt like he had covid       Problem List:    Patient Active Problem List    Diagnosis Date Noted    Routine general medical examination at a health care facility 09/30/2024     Priority: Medium    Chronic right shoulder pain 03/08/2022     Priority: Medium    Status post cervical discectomy 03/08/2022     Priority: Medium    Prediabetes 07/22/2021     Priority: Medium    Hyperlipidemia 05/03/2021     Priority: Medium    Lumbar arthropathy 12/23/2020     Priority: Medium    Cervical disc herniation 08/11/2020     Priority: Medium     Formatting of this note might be different from the original.  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  6/2014- left lateral scap and left lateral  mid low back, mild, negative margins   9/2013 moderate x 3 at the left infra scap. back superior and inferior (positive margins) & left prox. Lat.arm. Observe the left inferior scap back site.      Impaired fasting glucose 12/28/2012     Priority: Medium    Erectile dysfunction 12/21/2011     Priority: Medium    Pronation of feet 07/13/2009     Priority: Medium        Past Medical History:    Past Medical History:   Diagnosis Date    Arthritis 9/2000    High cholesterol        Past Surgical History:    Past Surgical History:   Procedure Laterality Date    CERVICAL ARTHOPLASTY/ ARTIFIC DISC, SINGLE      COLONOSCOPY N/A 04/07/2023    Procedure: COLONOSCOPY, FLEXIBLE, WITH LESION REMOVAL USING SNARE;  Surgeon: Shankar Acosta DO;  Location: [facility]    ESOPHAGOSCOPY, GASTROSCOPY, DUODENOSCOPY (EGD), COMBINED N/A 04/07/2023    Procedure: Esophagoscopy, gastroscopy, duodenoscopy (EGD), BIOPSY;  Surgeon: Shankar Acosta DO;  Location: [facility]    ESOPHAGOSCOPY, GASTROSCOPY, DUODENOSCOPY (EGD), COMBINED N/A 10/25/2024    Procedure: ESOPHAGOGASTRODUODENOSCOPY, WITH BIOPSY;  Surgeon: Sam Cross MD;  Location: [facility]    HEAD & NECK SURGERY  6/20    Cervical disk replacement    ORTHOPEDIC SURGERY  2/2010    Plantars fascitis    plantar Fachitiis surgery Left        Family History:    Family History   Problem Relation Age of Onset    Brain Tumor Mother     Other Cancer Mother         Brain cancer    Hyperlipidemia Father     Other - See Comments Sister         iron lung    Alcoholism Sister     Diabetes Brother     Obesity Brother     No Known Problems Maternal Grandfather     No Known Problems Paternal Grandmother     No Known Problems Paternal Grandfather        Social History:  Marital Status:  Single [1]  Social History     Tobacco Use    Smoking status: Former     Types: Cigarettes    Smokeless tobacco: Never   Vaping Use    Vaping status: Never Used   Substance Use Topics    Alcohol use: Not Currently     "Drug use: Never        Medications:    ondansetron (ZOFRAN ODT) 4 MG ODT tab  pantoprazole (PROTONIX) 40 MG EC tablet          Physical Exam   BP: 133/76  Pulse: 72  Temp: 98.4  F (36.9  C)  Resp: 18  Height: 175.3 cm (5' 9\")  Weight: 81.6 kg (180 lb)  SpO2: 97 %      Physical Exam  Vitals and nursing note reviewed.   Constitutional:       General: He is not in acute distress.     Appearance: He is not toxic-appearing or diaphoretic.   HENT:      Head: Normocephalic and atraumatic.   Eyes:      Extraocular Movements: Extraocular movements intact.      Pupils: Pupils are equal, round, and reactive to light.   Pulmonary:      Effort: Pulmonary effort is normal.   Abdominal:      Palpations: Abdomen is soft.      Tenderness: There is abdominal tenderness in the suprapubic area. There is left CVA tenderness. There is no right CVA tenderness, guarding or rebound.   Musculoskeletal:      Cervical back: Normal range of motion.   Neurological:      General: No focal deficit present.      Mental Status: He is alert and oriented to person, place, and time.   Psychiatric:         Mood and Affect: Mood normal.         Behavior: Behavior normal.         ED Course        Procedures                    Results for orders placed or performed during the hospital encounter of 11/07/24 (from the past 24 hours)   UA with Microscopic reflex to Culture    Specimen: Urine, Clean Catch   Result Value Ref Range    Color Urine Yellow Colorless, Straw, Light Yellow, Yellow    Appearance Urine Cloudy (A) Clear    Glucose Urine Negative Negative mg/dL    Bilirubin Urine Negative Negative    Ketones Urine Negative Negative mg/dL    Specific Gravity Urine 1.017 1.003 - 1.035    Blood Urine Large (A) Negative    pH Urine 8.0 (H) 5.0 - 7.0    Protein Albumin Urine >499 (A) Negative mg/dL    Urobilinogen Urine Normal Normal, 2.0 mg/dL    Nitrite Urine Negative Negative    Leukocyte Esterase Urine Moderate (A) Negative    RBC Urine >182 (H) <=2 /HPF "    WBC Urine >182 (H) <=5 /HPF    Narrative    Urine Culture ordered based on laboratory criteria       Medications - No data to display    Assessments & Plan (with Medical Decision Making)     I have reviewed the nursing notes.    I have reviewed the findings, diagnosis, plan and need for follow up with the patient.    Medical Decision Making  John Guzman is a 59 year old male with significant pmhx of suazo's esophagus, prediabetes, cervical disc herniation s/p cervical discectomy who presents for evaluation of hematuria. Ddx include kidney stones, UTI, pyelonephritis, other acute intraabdominal inflammatory or infectious process. Vital signs WNL. Patient is normotensive, afebrile, not tachycardic, and satting 97% on RA with a regular respiratory rate and effort.      On examination patient is mildly ill appearing but nontoxic, NAD. Abdomen is soft, but there is TTP in the suprapubic area. There was also mild L CVA tenderness to percussion. No midline low back pain. Mild tenderness to palpation across the lumbar paraspinal muscles bilaterally. No overlying skin changes. CBC with mild leukocytosis of 12.5 with elevated neutrophils. No anemia. CMP negative for electrolyte abnormality, renal dysfunction, or liver dysfunction. UA with large amount of RBC and WBC, as well as moderate leukocyte esterase and protein albumin.     Patient was signed out to Dr. Braden pending completion of CT scan of the abdomen/pelvis at the end of my shift.  Concern for stone, infected stone, versus cystitis or pyelonephritis.      New Prescriptions    No medications on file       Final diagnoses:   Hematuria  Flank pain       Erica Omalley PA-C  6/8/2024   [facility] EMERGENCY DEPT     Erica Omalley PA-C  11/11/24 [id number]

## 2024-11-18 ENCOUNTER — OFFICE VISIT (OUTPATIENT)
Dept: FAMILY MEDICINE | Facility: CLINIC | Age: 59
End: 2024-11-18
Payer: COMMERCIAL

## 2024-11-18 VITALS
HEIGHT: 69 IN | TEMPERATURE: 98.1 F | HEART RATE: 56 BPM | DIASTOLIC BLOOD PRESSURE: 72 MMHG | BODY MASS INDEX: 26.42 KG/M2 | WEIGHT: 178.4 LBS | RESPIRATION RATE: 20 BRPM | OXYGEN SATURATION: 98 % | SYSTOLIC BLOOD PRESSURE: 110 MMHG

## 2024-11-18 DIAGNOSIS — H61.21 IMPACTED CERUMEN OF RIGHT EAR: Primary | ICD-10-CM

## 2024-11-18 PROCEDURE — 69209 REMOVE IMPACTED EAR WAX UNI: CPT | Mod: RT

## 2024-11-18 PROCEDURE — 99213 OFFICE O/P EST LOW 20 MIN: CPT | Mod: 25

## 2024-11-18 ASSESSMENT — PAIN SCALES - GENERAL: PAINLEVEL_OUTOF10: NO PAIN (0)

## 2024-11-18 NOTE — PROGRESS NOTES
Assessment & Plan     Impacted cerumen of right ear  Ear plugged with cerumen and unable to visualize ear canal. Copious amounts of cerumen noted.  A water pick was then used to clear out the remaining cerumen. Following this procedure, the ear canal and TMs were visualized and appear normal.      - carbamide peroxide (DEBROX) 6.5 % otic solution; Place 5 drops into both ears daily as needed for other (PAtient wears hearing aids and has consistent issue with cerumen impactions.).  - OH REMOVAL IMPACTED CERUMEN IRRIGATION/LVG UNILAT    Seda Franklin is a 59 year old, presenting for the following health issues:  Ear Problem and Health Maintenance (Declines vaccinations today)        11/18/2024    12:36 PM   Additional Questions   Roomed by Meghna BETH LPN   Accompanied by self         11/18/2024    12:36 PM   Patient Reported Additional Medications   Patient reports taking the following new medications no new meds     Ear plugged with cerumen and unable to visualize ear canal. Copious amounts of cerumen noted.  A water pick was then used to clear out the remaining cerumen. Following this procedure, the ear canal and TMs were visualized and appear normal. Patient denies headache, pain, and nausea. Patient follow up regarding recent imaging and procedures, patient reported he understood.       History of Present Illness       Reason for visit:  Ear pain. I was told at my physical that my ears were plugged, i want them cleaned.  Symptom onset:  More than a month  Symptoms include:  Itching, ringing (bilateral ear fullness, right one seems more so than the left)  Symptom intensity:  Severe  Symptom progression:  Staying the same  Had these symptoms before:  Yes  Has tried/received treatment for these symptoms:  No  What makes it worse:  Wake up at 3 am ears are ringing so much i cant fall back asleep  What makes it better:  No   He is taking medications regularly.     Review of Systems  CONSTITUTIONAL: NEGATIVE for  "fever, chills, change in weight  ENT/MOUTH: NEGATIVE for ear, mouth and throat problems  RESP: NEGATIVE for significant cough or SOB  CV: NEGATIVE for chest pain, palpitations or peripheral edema      Objective    /72 (BP Location: Right arm, Patient Position: Sitting, Cuff Size: Adult Regular)   Pulse 56   Temp 98.1  F (36.7  C) (Tympanic)   Resp 20   Ht 1.753 m (5' 9\")   Wt 80.9 kg (178 lb 6.4 oz)   SpO2 98%   BMI 26.35 kg/m    Body mass index is 26.35 kg/m .  Physical Exam   GENERAL: alert and no distress  NECK: no adenopathy, no asymmetry, masses, or scars  RESP: lungs clear to auscultation - no rales, rhonchi or wheezes  CV: regular rate and rhythm, normal S1 S2, no S3 or S4, no murmur, click or rub, no peripheral edema  MS: no gross musculoskeletal defects noted, no edema        Signed Electronically by: BLAZE Hills CNP    "

## 2024-11-18 NOTE — NURSING NOTE
John Guzman is a 59 year old male who presents in clinic with complaint of impacted ear wax (cerumen).  Per the order of Vashti Barragan CNP, ear wax was removed from right side by flushing with warm water and manual debridement has been performed. Patient denies pain/dizziness/discharge/drainage  (if yes, stop procedure and huddle with provider).  Ear wax has been successfully removed.   Meghna BETH LPN

## 2024-12-31 ENCOUNTER — MYC MEDICAL ADVICE (OUTPATIENT)
Dept: FAMILY MEDICINE | Facility: CLINIC | Age: 59
End: 2024-12-31
Payer: COMMERCIAL

## 2024-12-31 DIAGNOSIS — K21.00 GASTROESOPHAGEAL REFLUX DISEASE WITH ESOPHAGITIS, UNSPECIFIED WHETHER HEMORRHAGE: ICD-10-CM

## 2024-12-31 DIAGNOSIS — H93.13 RINGING IN EARS, BILATERAL: Primary | ICD-10-CM

## 2024-12-31 NOTE — TELEPHONE ENCOUNTER
Routing to PCP  Please see Cathleen  I don't see anything mentioned in 11/18 visit regarding audiology appointment.  Referral pended if recommended.    Ramila TAMEZ RN  [facility]

## 2025-01-02 ENCOUNTER — OFFICE VISIT (OUTPATIENT)
Dept: AUDIOLOGY | Facility: CLINIC | Age: 60
End: 2025-01-02
Payer: COMMERCIAL

## 2025-01-02 RX ORDER — PANTOPRAZOLE SODIUM 40 MG/1
40 TABLET, DELAYED RELEASE ORAL DAILY
Qty: 90 TABLET | Refills: 1 | Status: SHIPPED | OUTPATIENT
Start: 2025-01-02

## 2025-01-02 NOTE — PROGRESS NOTES
Erroneous appointment scheduled.    Patient was scheduled for hearing evaluation due to tinnitus that keeps him awake after he wakes in the middle of the night.  He uses hearing aids and knows and understands his hearing loss.      He shared that he was told that I would be able to prescribe something to help with his sleeping at night.  When I clarified that as an audiologist, I have a doctorate in audiology but I am do not prescribe medication, he opted to not be seen today and requested his co-pay be returned to him    We very briefly discussed the nature of tinnitus and I escorted him back to the  and asked them to refund his co-pay.    All questions were fully answered.       Doyle Navarrete.   Doctor of Audiology  License #[id number]

## 2025-01-02 NOTE — TELEPHONE ENCOUNTER
Hello,    Thank you fro helping me. Patient reports ringing in ears and would like to see Audiology. Order for referral was placed.     Thank you    With Kind Regards,    BLAZE Hills CNP

## 2025-02-18 DIAGNOSIS — K21.00 GASTROESOPHAGEAL REFLUX DISEASE WITH ESOPHAGITIS, UNSPECIFIED WHETHER HEMORRHAGE: ICD-10-CM

## 2025-02-19 RX ORDER — PANTOPRAZOLE SODIUM 40 MG/1
40 TABLET, DELAYED RELEASE ORAL DAILY
Qty: 90 TABLET | Refills: 0 | Status: SHIPPED | OUTPATIENT
Start: 2025-02-19

## 2025-05-27 ENCOUNTER — OFFICE VISIT (OUTPATIENT)
Dept: FAMILY MEDICINE | Facility: CLINIC | Age: 60
End: 2025-05-27
Payer: COMMERCIAL

## 2025-05-27 VITALS
TEMPERATURE: 97.6 F | BODY MASS INDEX: 25.34 KG/M2 | HEIGHT: 70 IN | SYSTOLIC BLOOD PRESSURE: 136 MMHG | OXYGEN SATURATION: 97 % | DIASTOLIC BLOOD PRESSURE: 80 MMHG | WEIGHT: 177 LBS | RESPIRATION RATE: 14 BRPM | HEART RATE: 72 BPM

## 2025-05-27 DIAGNOSIS — M54.50 ACUTE BILATERAL LOW BACK PAIN WITHOUT SCIATICA: Primary | ICD-10-CM

## 2025-05-27 PROCEDURE — 3075F SYST BP GE 130 - 139MM HG: CPT | Performed by: FAMILY MEDICINE

## 2025-05-27 PROCEDURE — 99213 OFFICE O/P EST LOW 20 MIN: CPT | Performed by: FAMILY MEDICINE

## 2025-05-27 PROCEDURE — 3079F DIAST BP 80-89 MM HG: CPT | Performed by: FAMILY MEDICINE

## 2025-05-27 PROCEDURE — 1125F AMNT PAIN NOTED PAIN PRSNT: CPT | Performed by: FAMILY MEDICINE

## 2025-05-27 RX ORDER — NAPROXEN 500 MG/1
500 TABLET ORAL 2 TIMES DAILY WITH MEALS
Qty: 30 TABLET | Refills: 0 | Status: SHIPPED | OUTPATIENT
Start: 2025-05-27

## 2025-05-27 ASSESSMENT — PAIN SCALES - GENERAL: PAINLEVEL_OUTOF10: SEVERE PAIN (10)

## 2025-05-27 NOTE — PATIENT INSTRUCTIONS
Utilize naproxen 500 mg twice daily for next 3 days, then as needed.     Utilize tizandine 4 mg three times per day as needed.

## 2025-05-27 NOTE — PROGRESS NOTES
Assessment & Plan     Acute bilateral low back pain without sciatica  -- muscle spasm like pain. No red flags on exam or history. He defers imaging at this time. Has used NSAID's and muscle relaxants in the past which has been helpful. Discussed conservative cares of rest, heat, gentle stretching, naproxen as needed, zanaflex as needed. If not improving needs return visit and would obtain imaging and consideration for physical therapy or ortho spine. Discussed red flag symptoms to be seen emergently. All questions answered.   - naproxen (NAPROSYN) 500 MG tablet  Dispense: 30 tablet; Refill: 0  - tiZANidine (ZANAFLEX) 4 MG tablet  Dispense: 40 tablet; Refill: 1      The risks, benefits and treatment options of prescribed medications or other treatments have been discussed with the patient. The patient verbalized their understanding and should call or follow up if no improvement or if they develop further problems.      Seda Franklin is a 60 year old, presenting for the following health issues:  Back Pain        5/27/2025     4:20 PM   Additional Questions   Roomed by Marbella Rosas   Accompanied by self     History of Present Illness       Back Pain:  He presents for follow up of back pain. Patient's back pain is a new problem.    Original cause of back pain: turning/bending  First noticed back pain: today  Patient feels back pain: constantlyLocation of back pain:  Right lower back and left lower back  Description of back pain: burning, cramping, dull ache, fullness, gnawing, sharp, shooting and stabbing  Back pain spreads: nowhere    Since patient first noticed back pain, pain is: gradually worsening  Does back pain interfere with his job:  Yes  On a scale of 1-10 (10 being the worst), patient describes pain as:  10  What makes back pain worse: bending, certain positions, lying down, standing, stress and twisting   Acupuncture: not tried  Acetaminophen: not tried  Activity or exercise: not tried  Chiropractor:   Not tried  Cold: not tried  Heat: not tried  Massage: not tried  Muscle relaxants: not tried  NSAIDS: not helpful  Opioids: not tried  Physical Therapy: not tried  Rest: not tried  Steroid Injection: not tried  Stretching: not helpful  Surgery: not tried  TENS unit: not tried  Topical pain relievers: not helpful  Other healthcare providers patient is seeing for back pain: None   He is taking medications regularly.            Pain History:  When did you first notice your pain? This has been coming and going for many years. Last episode was 2 years ago until this morning.    Have you seen anyone else for your pain? No  How has your pain affected your ability to work? Pain does not limit ability to work   What type of work do you or did you do?   Where in your body do you have pain? Back Pain  Onset/Duration: This morning  Description:   Location of pain: low back bilateral  Character of pain: sharp, dull ache, gnawing, burning, fullness, cramping, and constant  Pain radiation: none  New numbness or weakness in legs, not attributed to pain: no   Intensity: Currently 10/10  Progression of Symptoms: worsening and constant  History:   Specific cause: turning/bending  Pain interferes with job: YES  History of back problems: recurrent self limited episodes of low back pain in the past and previous osteoarthritis of lumbar spine  Any previous MRI or X-rays: Yes- prevously but nothing now  Sees a specialist for back pain: No  Alleviating factors:   Improved by: N/A    Precipitating factors:  Worsened by: Lifting, Bending, Standing, Lying Flat, and Walking  Therapies tried and outcome: activity, NSAIDs, standing, and stretch- None of that helped      No loss of bowel or bladder function.   No perineum anesthesia.   No leg weakness.   Has tried advil.   Has tried icy hot.   Tight back musculature.   No spinal tenderness.   No radicular symptoms down the leg.   No fevers.           Objective    /80 (BP Location:  "Right arm, Patient Position: Sitting, Cuff Size: Adult Regular)   Pulse 72   Temp 97.6  F (36.4  C) (Tympanic)   Resp 14   Ht 1.765 m (5' 9.5\")   Wt 80.3 kg (177 lb)   SpO2 97%   BMI 25.76 kg/m    Body mass index is 25.76 kg/m .  Physical Exam   General: alert, cooperative, mild distress  MSK back: non-tender over the thoracic and lumbar spine. Tight paraspinal musculature lower lumbar region. ROM limited in flexion, extension, side bend and rotation due to pain. LE strength full. LE sensation intact. Modified SLR negative bilaterally.         Signed Electronically by: Neno Sargent DO    "

## 2025-08-07 ENCOUNTER — TRANSFERRED RECORDS (OUTPATIENT)
Dept: HEALTH INFORMATION MANAGEMENT | Facility: CLINIC | Age: 60
End: 2025-08-07
Payer: COMMERCIAL

## 2025-09-01 ENCOUNTER — PATIENT OUTREACH (OUTPATIENT)
Dept: CARE COORDINATION | Facility: CLINIC | Age: 60
End: 2025-09-01
Payer: COMMERCIAL

## 2025-09-04 DIAGNOSIS — K21.00 GASTROESOPHAGEAL REFLUX DISEASE WITH ESOPHAGITIS, UNSPECIFIED WHETHER HEMORRHAGE: ICD-10-CM

## 2025-09-04 RX ORDER — PANTOPRAZOLE SODIUM 40 MG/1
40 TABLET, DELAYED RELEASE ORAL DAILY
Qty: 30 TABLET | Refills: 2 | Status: SHIPPED | OUTPATIENT
Start: 2025-09-04

## (undated) DEVICE — ENDO FORCEP ENDOJAW BIOPSY 2.8MMX230CM FB-220U

## (undated) DEVICE — ENDO SNARE EXACTO COLD 9MM LOOP 2.4MMX230CM 00711115

## (undated) RX ORDER — PROPOFOL 10 MG/ML
INJECTION, EMULSION INTRAVENOUS
Status: DISPENSED
Start: 2023-04-07

## (undated) RX ORDER — LIDOCAINE HYDROCHLORIDE 10 MG/ML
INJECTION, SOLUTION EPIDURAL; INFILTRATION; INTRACAUDAL; PERINEURAL
Status: DISPENSED
Start: 2024-10-25

## (undated) RX ORDER — GLYCOPYRROLATE 0.2 MG/ML
INJECTION, SOLUTION INTRAMUSCULAR; INTRAVENOUS
Status: DISPENSED
Start: 2023-04-07